# Patient Record
Sex: FEMALE | Race: BLACK OR AFRICAN AMERICAN | Employment: OTHER | URBAN - METROPOLITAN AREA
[De-identification: names, ages, dates, MRNs, and addresses within clinical notes are randomized per-mention and may not be internally consistent; named-entity substitution may affect disease eponyms.]

---

## 2017-02-24 RX ORDER — CARVEDILOL PHOSPHATE 40 MG/1
40 CAPSULE, EXTENDED RELEASE ORAL
Qty: 30 CAP | Refills: 6 | Status: SHIPPED | OUTPATIENT
Start: 2017-02-24 | End: 2017-12-11

## 2017-02-24 NOTE — TELEPHONE ENCOUNTER
Patient called stating she spoke with a Medicare rep and they told her the Coreg medication will have to be a changed to another medication that is covered by insurance. Please call patient to get more info from her. She said if you cant reach her at home to reach her on cell phone listed.

## 2017-03-31 RX ORDER — ATORVASTATIN CALCIUM 20 MG/1
TABLET, FILM COATED ORAL
Qty: 90 TAB | Refills: 0 | Status: SHIPPED | OUTPATIENT
Start: 2017-03-31 | End: 2017-06-26 | Stop reason: DRUGHIGH

## 2017-06-26 ENCOUNTER — OFFICE VISIT (OUTPATIENT)
Dept: ENDOCRINOLOGY | Age: 67
End: 2017-06-26

## 2017-06-26 VITALS
BODY MASS INDEX: 40.7 KG/M2 | WEIGHT: 215.6 LBS | SYSTOLIC BLOOD PRESSURE: 136 MMHG | HEIGHT: 61 IN | OXYGEN SATURATION: 97 % | TEMPERATURE: 97.4 F | RESPIRATION RATE: 18 BRPM | HEART RATE: 96 BPM | DIASTOLIC BLOOD PRESSURE: 74 MMHG

## 2017-06-26 DIAGNOSIS — I10 ESSENTIAL HYPERTENSION: ICD-10-CM

## 2017-06-26 DIAGNOSIS — E78.2 MIXED HYPERLIPIDEMIA: ICD-10-CM

## 2017-06-26 RX ORDER — ATORVASTATIN CALCIUM 40 MG/1
40 TABLET, FILM COATED ORAL
Qty: 30 TAB | Refills: 6 | Status: SHIPPED | OUTPATIENT
Start: 2017-06-26 | End: 2018-02-08 | Stop reason: SDUPTHER

## 2017-06-26 NOTE — MR AVS SNAPSHOT
Visit Information Date & Time Provider Department Dept. Phone Encounter #  
 6/26/2017  9:15 AM Amy Adrian MD [facility] [phone] [id number] Follow-up Instructions Return in about 6 months (around 12/26/2017). Upcoming Health Maintenance Date Due Hepatitis C Screening 1950 FOOT EXAM Q1 3/20/1962 DTaP/Tdap/Td series (1 - Tdap) 3/20/1973 BREAST CANCER SCRN MAMMOGRAM 3/20/2002 FOBT Q 1 YEAR AGE 50-75 3/20/2002 ZOSTER VACCINE AGE 60> 3/20/2012 OSTEOPOROSIS SCREENING (DEXA) 3/20/2017 Pneumococcal 65+ Low/Medium Risk (1 of 2 - PCV13) 3/20/2017 MEDICARE YEARLY EXAM 3/20/2017 INFLUENZA AGE 9 TO ADULT 8/1/2017 EYE EXAM RETINAL OR DILATED Q1 12/5/2017 HEMOGLOBIN A1C Q6M 12/9/2017 MICROALBUMIN Q1 6/9/2018 LIPID PANEL Q1 6/9/2018 GLAUCOMA SCREENING Q2Y 12/5/2018 Allergies as of 6/26/2017  Review Complete On: 6/26/2017 By: Amy Adrian MD  
 No Known Allergies Current Immunizations  Never Reviewed No immunizations on file. Not reviewed this visit You Were Diagnosed With   
  
 Codes Comments Uncontrolled type 2 diabetes mellitus with stage 2 chronic kidney disease, without long-term current use of insulin (HCC)    -  Primary ICD-10-CM: E11.22, E11.65, N18.2 ICD-9-CM: 250.52, 585.2 Mixed hyperlipidemia     ICD-10-CM: E78.2 ICD-9-CM: 272.2 Essential hypertension     ICD-10-CM: I10 
ICD-9-CM: 401.9 Vitals BP Pulse Temp Resp Height(growth percentile) Weight(growth percentile) 136/74 (BP 1 Location: Right arm, BP Patient Position: Sitting) 96 97.4 °F (36.3 °C) (Oral) 18 5' 1\" (1.549 m) 215 lb 9.6 oz (97.8 kg) SpO2 BMI OB Status Smoking Status 97% 40.74 kg/m2 Unknown Never Smoker BMI and BSA Data Body Mass Index Body Surface Area 40.74 kg/m 2 2.05 m 2 Preferred Pharmacy Pharmacy Name Phone [facility], [address] [place] [address] Jennifer Lopez [phone] Your Updated Medication List  
  
   
This list is accurate as of: 6/26/17  9:59 AM.  Always use your most recent med list.  
  
  
  
  
 * amLODIPine 10 mg tablet Commonly known as:  Gaurav Hartland TAKE 1 TABLET BY MOUTH DAILY  
  
 * amLODIPine 10 mg tablet Commonly known as:  Gaurav Hartland TAKE 1 TABLET BY MOUTH DAILY  
  
 aspirin 81 mg tablet Take 81 mg by mouth. atorvastatin 40 mg tablet Commonly known as:  LIPITOR Take 1 Tab by mouth nightly. Stop 20 mg dose * carvedilol 40 mg CR capsule Commonly known as:  COREG CR Take 1 Cap by mouth daily (with breakfast). Dispense generic. Stop coreg * COREG CR 40 mg CR capsule Generic drug:  carvedilol TAKE 1 CAPSULE BY MOUTH EVERY DAY WITH BREAKFAST  
  
 glucose blood VI test strips strip Commonly known as:  ONE TOUCH TEST  
100 Each by Does Not Apply route two (2) times a day. Dx. Code 250.02  
  
 loratadine 10 mg tablet Commonly known as:  Arleta Pals Take 10 mg by mouth.  
  
 metFORMIN 500 mg tablet Commonly known as:  GLUCOPHAGE  
TAKE 1 TABLET BY MOUTH TWICE DAILY WITH MEAL  
  
 TRADJENTA 5 mg tablet Generic drug:  linagliptin TAKE 1 TABLET BY MOUTH DAILY * Notice: This list has 4 medication(s) that are the same as other medications prescribed for you. Read the directions carefully, and ask your doctor or other care provider to review them with you. Prescriptions Sent to Pharmacy Refills  
 atorvastatin (LIPITOR) 40 mg tablet 6 Sig: Take 1 Tab by mouth nightly. Stop 20 mg dose Class: Normal  
 Pharmacy: [address] [place] [address] AT [facility] of [address] [place] [address] #: [phone] Route: Oral  
  
Follow-up Instructions Return in about 6 months (around 12/26/2017). Patient Instructions Increase to lipitor  40 mg at night  
 
 
tradjenta 5 mg a day Metformin 500 mg twice a day with meals  
 
on norvasc 10 mg a day  
on coreg cr 40 mg once a day Introducing [facility] SERVICES! Dear Daksha Yap: Thank you for requesting a [employer] account. Our records indicate that you already have an active [email or web address] account. You can access your account anytime at [email or web address] Did you know that you can access your hospital and ER discharge instructions at any time in [email or web address]? You can also review all of your test results from your hospital stay or ER visit. Additional Information If you have questions, please visit the Frequently Asked Questions section of the [email or web address] website at [email or web address] Remember, [email or web address] is NOT to be used for urgent needs. For medical emergencies, dial 911. Now available from your iPhone and Android! Please provide this summary of care documentation to your next provider. Your primary care clinician is listed as Alhaji Sheets. If you have any questions after today's visit, please call [phone].

## 2017-06-26 NOTE — PATIENT INSTRUCTIONS
Increase to lipitor  40 mg at night       tradjenta 5 mg a day   Metformin 500 mg twice a day with meals     on norvasc 10 mg a day   on coreg cr 40 mg once a day

## 2017-06-26 NOTE — PROGRESS NOTES
Wt Readings from Last 3 Encounters:   06/26/17 215 lb 9.6 oz (97.8 kg)   12/16/16 213 lb (96.6 kg)   06/01/16 211 lb (95.7 kg)     Temp Readings from Last 3 Encounters:   06/26/17 97.4 °F (36.3 °C) (Oral)   12/16/16 97.5 °F (36.4 °C) (Oral)   06/01/16 96.9 °F (36.1 °C) (Oral)     BP Readings from Last 3 Encounters:   06/26/17 136/74   12/16/16 160/71   06/01/16 157/84     Pulse Readings from Last 3 Encounters:   06/26/17 96   12/16/16 95   06/01/16 83     Lab Results   Component Value Date/Time    Hemoglobin A1c 6.6 06/09/2017 11:08 AM    Hemoglobin A1c (POC) 5.9 02/28/2014 12:11 PM   Foot Exam: no  Eye Exam: June 2017

## 2017-06-26 NOTE — PROGRESS NOTES
HISTORY OF PRESENT ILLNESS    Mony Bailon is a 61  y.o. female. HPI,   Patient here for f/u after last  visit for  Type 2 diabetes mellitus  From [facility] [address] 2016  Weight gain of 2  lbs ( gained total of 38 lbs)  She got compliant with diet     she is able to tolerate metformin and tradjenta  She has been doing well  She feels fine  Gained 2 lbs   Reports no post prandial pain at all       Good range of  blood sugars       Old history : .   Current A1C is 6.7 % today, compared to [facility]- over 10 %   Current diabetic medications include janumet and amaryl 2mg a day. accompanied by her sister, who is a cook   Current monitoring regimen: home blood tests - 2-3 times daily   Home blood sugar records: trend: stable   Any episodes of hypoglycemia? no   Weight trend: stable   Prior visit with dietician: yes -     Review of Systems   Constitutional: Negative. HENT: Negative. Eyes: Negative for pain and redness. Respiratory: Negative. Cardiovascular: Negative for chest pain, palpitations and leg swelling. Gastrointestinal: Negative. Negative for constipation. Genitourinary: Negative. Musculoskeletal: Negative for myalgias. Skin: Negative. Neurological: Negative. Endo/Heme/Allergies: Negative. Psychiatric/Behavioral: Negative for depression and memory loss. The patient does not have insomnia. Physical Exam   Constitutional: She is oriented to person, place, and time. She appears well-developed and well-nourished. HENT:   Head: Normocephalic. Eyes: Conjunctivae and EOM are normal. Pupils are equal, round, and reactive to light. Neck: Normal range of motion. Neck supple. No JVD present. No tracheal deviation present. No thyromegaly present. Cardiovascular: Normal rate, regular rhythm and normal heart sounds. No murmur heard. Pulmonary/Chest: Breath sounds normal.   Abdominal: Soft. Bowel sounds are normal.   Musculoskeletal: Normal range of motion.    Lymphadenopathy:   She has no cervical adenopathy. Neurological: She is alert and oriented to person, place, and time. She has normal reflexes. Skin: Skin is warm. Psychiatric: She has a normal mood and affect. Diabetic feet exam: june 2017    Dr. Zhang [place]      H/o partial or complete amputation of foot: n  H/o previous foot ulceration: yes  H/o pre - ulcerative callus: n  H/o peripheral neuropathy and callus: yes  H/o poor circulation     JESUS   : y  Foot deformity : none      Lab Results   Component Value Date/Time    Hemoglobin A1c 6.6 06/09/2017 11:08 AM    Hemoglobin A1c 6.4 12/09/2016 09:47 AM    Hemoglobin A1c 6.3 05/25/2016 08:16 AM    Microalb/Creat ratio (ug/mg creat.) 8.8 06/09/2017 11:08 AM    LDL, calculated 106 06/09/2017 11:08 AM    Creatinine 1.11 06/09/2017 11:08 AM      Lab Results   Component Value Date/Time    Cholesterol, total 164 06/09/2017 11:08 AM    HDL Cholesterol 42 06/09/2017 11:08 AM    LDL, calculated 106 06/09/2017 11:08 AM    Triglyceride 82 06/09/2017 11:08 AM     Lab Results   Component Value Date/Time    AST (SGOT) 14 06/09/2017 11:08 AM    Alk. phosphatase 97 06/09/2017 11:08 AM     Lab Results   Component Value Date/Time    GFR est AA 60 06/09/2017 11:08 AM    GFR est non-AA 52 06/09/2017 11:08 AM    Creatinine 1.11 06/09/2017 11:08 AM    BUN 26 06/09/2017 11:08 AM    Sodium 140 06/09/2017 11:08 AM    Potassium 4.1 06/09/2017 11:08 AM    Chloride 102 06/09/2017 11:08 AM    CO2 20 06/09/2017 11:08 AM          ASSESSMENT and PLAN    1.  Type 2 DM uncontrolled : A1c is  6.6 %      From June 2017   Compared to   6.4 %   From dec 2016   Compared to    6.3 %      From  May 2016    Compared to  6.3 %    From   Nov 2015  Compared to    6.4 %        From aug 2015  Compared to 6.3 %   From May  2015 compared to  6.5  %     From feb 2015 compared to  6.1 %  From aug 2014 compared to   5.9 % from feb 2014 compared to 6.2 % from oct 2013 compared to   6.7 % from July 203 compared to  5.9 % from march 6 2013 compared to 6.7 % jan 2013 compared to 10 % from nov 2012     plateaued good   control   She has been maintaining  good  glycemic control on current regimen  Continue on tradjenta and  metformin 500 mg  Bid , low dose being used with  caution for renal insufficiency  as her creat is fluctuating     She is told to restrict carbs significantly    She  is advised to check blood sugars one times daily by rotation method. reviewed medications and side effects in detail   lab results and schedule of future lab studies reviewed with patient       2. Hypoglycemia : should not occur on current regimen    3. HTN : well controlled, on norvasc and coreg cr 40 mg   Off  exforge 160/10 once a day    As am afraid addition of diuretic could worsen renal insufficiency and would make metformin to go out of her care    Patient is educated about importance of compliance with anti-hypertensives especially ARB/ACEI     4. Dyslipidemia : lipids  Out of target on lipitor 20 mg hs   Increase to 40 mg hs     Patient is educated about benefits and adverse effects of statins and explained how benefits outweigh risk. 5. use of aspirin to prevent MI and TIA's discussed     6.,h/o right foot diabetic ulcer, fourth toe -- resolved ,  And that was when she found out to be diabetic - October 2012  Will get PAD test result   [facility]        7.  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

## 2017-06-30 RX ORDER — ATORVASTATIN CALCIUM 20 MG/1
TABLET, FILM COATED ORAL
Qty: 90 TAB | Refills: 0 | Status: SHIPPED | OUTPATIENT
Start: 2017-06-30 | End: 2018-02-08 | Stop reason: DRUGHIGH

## 2017-12-04 ENCOUNTER — HOSPITAL ENCOUNTER (OUTPATIENT)
Dept: LAB | Age: 67
Discharge: HOME | End: 2017-12-04
Payer: MEDICARE

## 2017-12-04 PROCEDURE — 80061 LIPID PANEL: CPT

## 2017-12-04 PROCEDURE — 36415 COLL VENOUS BLD VENIPUNCTURE: CPT

## 2017-12-04 PROCEDURE — 80053 COMPREHEN METABOLIC PANEL: CPT

## 2017-12-04 PROCEDURE — 83036 HEMOGLOBIN GLYCOSYLATED A1C: CPT

## 2017-12-04 PROCEDURE — 82043 UR ALBUMIN QUANTITATIVE: CPT

## 2017-12-11 ENCOUNTER — OFFICE VISIT (OUTPATIENT)
Dept: ENDOCRINOLOGY | Age: 67
End: 2017-12-11

## 2017-12-11 VITALS
TEMPERATURE: 96.2 F | WEIGHT: 216.4 LBS | SYSTOLIC BLOOD PRESSURE: 151 MMHG | RESPIRATION RATE: 16 BRPM | HEART RATE: 82 BPM | HEIGHT: 61 IN | DIASTOLIC BLOOD PRESSURE: 78 MMHG | BODY MASS INDEX: 40.86 KG/M2 | OXYGEN SATURATION: 98 %

## 2017-12-11 DIAGNOSIS — I10 ESSENTIAL HYPERTENSION: ICD-10-CM

## 2017-12-11 DIAGNOSIS — E78.2 MIXED HYPERLIPIDEMIA: ICD-10-CM

## 2017-12-11 PROBLEM — E66.01 OBESITY, MORBID (HCC): Status: ACTIVE | Noted: 2017-12-11

## 2017-12-11 RX ORDER — DILTIAZEM HYDROCHLORIDE 240 MG/1
240 CAPSULE, COATED, EXTENDED RELEASE ORAL DAILY
Qty: 30 CAP | Refills: 6 | Status: SHIPPED | OUTPATIENT
Start: 2017-12-11 | End: 2018-02-08

## 2017-12-11 NOTE — PATIENT INSTRUCTIONS
Increase to lipitor  40 mg at night       STOP tradjenta 5 mg a day   Start on onglyza 5 mg a day   Metformin 500 mg twice a day with meals     STOP norvasc 10 mg a day , and  STOP  coreg cr 40 mg   Start on  Diltiazem  mg  once a day

## 2017-12-11 NOTE — PROGRESS NOTES
Chief Complaint   Patient presents with    Diabetes     1. Have you been to the ER, urgent care clinic since your last visit? Hospitalized since your last visit? No    2. Have you seen or consulted any other health care providers outside of the [address] [place] [address] since your last visit? Include any pap smears or colon screening.  No     Wt Readings from Last 3 Encounters:   12/11/17 216 lb 6.4 oz (98.2 kg)   06/26/17 215 lb 9.6 oz (97.8 kg)   12/16/16 213 lb (96.6 kg)     Temp Readings from Last 3 Encounters:   12/11/17 96.2 °F (35.7 °C) (Oral)   06/26/17 97.4 °F (36.3 °C) (Oral)   12/16/16 97.5 °F (36.4 °C) (Oral)     BP Readings from Last 3 Encounters:   12/11/17 151/78   06/26/17 136/74   12/16/16 160/71     Pulse Readings from Last 3 Encounters:   12/11/17 82   06/26/17 96   12/16/16 95     Pt do not meter  Pt had eye exam   No foot exam

## 2017-12-11 NOTE — PROGRESS NOTES
HISTORY OF PRESENT ILLNESS    Laverne Chilel is a 74  y.o. female. HPI,   Patient here for f/u after last  visit for  Type 2 diabetes mellitus  From June 2017       She is  compliant with diet   she is able to tolerate metformin and tradjenta  She has been doing well    She is in donut hole (paying out of pocket )  She feels fine  Gained 1 lbs   Reports no post prandial pain at all   Good range of  blood sugars       Old history : .   Current A1C is 6.7 % today, compared to [facility]- over 10 %   Current diabetic medications include janumet and amaryl 2mg a day. accompanied by her sister, who is a cook   Current monitoring regimen: home blood tests - 2-3 times daily   Home blood sugar records: trend: stable   Any episodes of hypoglycemia? no   Weight trend: stable   Prior visit with dietician: yes -     Review of Systems   Constitutional: Negative. HENT: Negative. Eyes: Negative for pain and redness. Respiratory: Negative. Cardiovascular: Negative for chest pain, palpitations and leg swelling. Gastrointestinal: Negative. Negative for constipation. Genitourinary: Negative. Musculoskeletal: Negative for myalgias. Skin: Negative. Neurological: Negative. Endo/Heme/Allergies: Negative. Psychiatric/Behavioral: Negative for depression and memory loss. The patient does not have insomnia. Physical Exam   Constitutional: She is oriented to person, place, and time. She appears well-developed and well-nourished. HENT:   Head: Normocephalic. Eyes: Conjunctivae and EOM are normal. Pupils are equal, round, and reactive to light. Neck: Normal range of motion. Neck supple. No JVD present. No tracheal deviation present. No thyromegaly present. Cardiovascular: Normal rate, regular rhythm and normal heart sounds. No murmur heard. Pulmonary/Chest: Breath sounds normal.   Abdominal: Soft. Bowel sounds are normal.   Musculoskeletal: Normal range of motion.    Lymphadenopathy:   She has no cervical adenopathy. Neurological: She is alert and oriented to person, place, and time. She has normal reflexes. Skin: Skin is warm. Psychiatric: She has a normal mood and affect. Diabetic feet exam: june 2017    Dr. Vidal Perrin      H/o partial or complete amputation of foot: n  H/o previous foot ulceration: yes  H/o pre - ulcerative callus: n  H/o peripheral neuropathy and callus: yes  H/o poor circulation     JESUS   : y  Foot deformity : none      Lab Results   Component Value Date/Time    Hemoglobin A1c 6.3 12/04/2017 09:51 AM    Hemoglobin A1c 6.6 06/09/2017 11:08 AM    Hemoglobin A1c 6.4 12/09/2016 09:47 AM    Microalb/Creat ratio (ug/mg creat.) 48.2 12/04/2017 09:51 AM    LDL, calculated 91 12/04/2017 09:51 AM    Creatinine 1.18 12/04/2017 09:51 AM      Lab Results   Component Value Date/Time    Cholesterol, total 148 12/04/2017 09:51 AM    HDL Cholesterol 40 12/04/2017 09:51 AM    LDL, calculated 91 12/04/2017 09:51 AM    Triglyceride 83 12/04/2017 09:51 AM     Lab Results   Component Value Date/Time    AST (SGOT) 16 12/04/2017 09:51 AM    Alk. phosphatase 106 12/04/2017 09:51 AM     Lab Results   Component Value Date/Time    GFR est AA 56 12/04/2017 09:51 AM    GFR est non-AA 49 12/04/2017 09:51 AM    Creatinine 1.18 12/04/2017 09:51 AM    BUN 24 12/04/2017 09:51 AM    Sodium 143 12/04/2017 09:51 AM    Potassium 4.5 12/04/2017 09:51 AM    Chloride 103 12/04/2017 09:51 AM    CO2 21 12/04/2017 09:51 AM          ASSESSMENT and PLAN    1.  Type 2 DM uncontrolled : A1c is  6.3 %     From dec 2017    compared to  6.6 %      From June 2017   Compared to   6.4 %   From dec 2016   Compared to    6.3 %      From  May 2016    Compared to  6.3 %    From   Nov 2015  Compared to    6.4 %        From aug 2015  Compared to 6.3 %   From May  2015 compared to  6.5  %     From feb 2015 compared to  6.1 %  From aug 2014 compared to   5.9 % from feb 2014 compared to 6.2 % from oct 2013 compared to   6.7 % from July 203 compared to  5.9 % from march 6 2013 compared to 6.7 % jan 2013 compared to 10 % from nov 2012     plateaued good   control   She has been maintaining  good  glycemic control on current regimen  Continue on tradjenta and  metformin 500 mg  Bid , low dose being used with  caution for renal insufficiency  as her creat is fluctuating   Changing to onglyza  from [address]  She is told to restrict carbs significantly    She  is advised to check blood sugars one times daily by rotation method. reviewed medications and side effects in detail   lab results and schedule of future lab studies reviewed with patient       2. Hypoglycemia : should not occur on current regimen    3. HTN : well controlled, on norvasc and coreg cr 40 mg   Off  exforge 160/10 once a day for elevated creat levels     I am choosing cardizem and stopping coreg cr and norvasc   Coreg cr is pretty expensive for her now as she is in donut hole     As am afraid addition of diuretic could worsen renal insufficiency and would make metformin to go out of her care    Patient is educated about importance of compliance with anti-hypertensives especially ARB/ACEI     4. Dyslipidemia : lipids  Out of target on lipitor 20 mg hs   Increase to 40 mg hs     Patient is educated about benefits and adverse effects of statins and explained how benefits outweigh risk. 5. use of aspirin to prevent MI and TIA's discussed     6.,h/o right foot diabetic ulcer, fourth toe -- resolved ,  And that was when she found out to be diabetic - October 2012   PAD test result   From [facility]      7.  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

## 2018-01-22 ENCOUNTER — TELEPHONE (OUTPATIENT)
Dept: ENDOCRINOLOGY | Age: 68
End: 2018-01-22

## 2018-01-22 DIAGNOSIS — E11.65 TYPE 2 DIABETES MELLITUS WITH HYPERGLYCEMIA, WITHOUT LONG-TERM CURRENT USE OF INSULIN (HCC): Primary | ICD-10-CM

## 2018-01-22 DIAGNOSIS — E78.2 MIXED HYPERLIPIDEMIA: ICD-10-CM

## 2018-01-22 DIAGNOSIS — I10 ESSENTIAL HYPERTENSION: ICD-10-CM

## 2018-01-22 NOTE — TELEPHONE ENCOUNTER
Patient would liek to talk to nurse about medications given to her.   Patient can be reached at [phone]

## 2018-01-24 NOTE — TELEPHONE ENCOUNTER
Attempted to call patient in regards to question and inform her Onglyza is no longer formulary and insurance prefers [facility]. A prescription will be sent to pharmacy. Left message and call back number.

## 2018-02-02 NOTE — TELEPHONE ENCOUNTER
Spoke with patient and she did start onglyza and cardizem in December. Patient has a rash all of her body. Prescribed steroid but is concerned about taking it due to increasing her blood sugar. Please advise.

## 2018-02-02 NOTE — TELEPHONE ENCOUNTER
Patient says she developed a rash on her arms, legs, and starting to move all over her body. Patient went to primary care physician and was prescribed a steroid. Patient says she has not started steroid medication because she is concerned about blood sugar rising. Please advise.

## 2018-02-03 ENCOUNTER — IP HISTORICAL/CONVERTED ENCOUNTER (OUTPATIENT)
Dept: OTHER | Age: 68
End: 2018-02-03

## 2018-02-06 ENCOUNTER — TELEPHONE (OUTPATIENT)
Dept: ENDOCRINOLOGY | Age: 68
End: 2018-02-06

## 2018-02-06 NOTE — TELEPHONE ENCOUNTER
Patient would like to discuss going back to the original drug she was on prior to going in the hospital. Please call

## 2018-02-06 NOTE — TELEPHONE ENCOUNTER
I spoke to pt regarding her rash 2 days ago     She got hospitalized and upon discharge, will decide on meds     She has not started on tradjenta , but she did start on cartia in place of coreg Jay Boogie MD pulmonary embolism

## 2018-02-07 ENCOUNTER — TELEPHONE (OUTPATIENT)
Dept: ENDOCRINOLOGY | Age: 68
End: 2018-02-07

## 2018-02-07 NOTE — TELEPHONE ENCOUNTER
Please give me a call I really need to talk to [facility] personally about my medication.

## 2018-02-08 ENCOUNTER — OFFICE VISIT (OUTPATIENT)
Dept: ENDOCRINOLOGY | Age: 68
End: 2018-02-08

## 2018-02-08 VITALS
SYSTOLIC BLOOD PRESSURE: 164 MMHG | HEART RATE: 94 BPM | RESPIRATION RATE: 12 BRPM | TEMPERATURE: 97.9 F | DIASTOLIC BLOOD PRESSURE: 86 MMHG | WEIGHT: 205 LBS | BODY MASS INDEX: 38.71 KG/M2 | HEIGHT: 61 IN

## 2018-02-08 DIAGNOSIS — I10 ESSENTIAL HYPERTENSION: ICD-10-CM

## 2018-02-08 DIAGNOSIS — E78.2 MIXED HYPERLIPIDEMIA: ICD-10-CM

## 2018-02-08 NOTE — MR AVS SNAPSHOT
49 [facility] [address] [facility] [address] 
[phone] Patient: Keiry Cano MRN: [id number] PLT:[id number]/97/[id number] Visit Information Date & Time Provider Department Dept. Phone Encounter #  
 2/8/2018 11:45 AM Veena Fonseca MD [facility] [phone] [id number] Your Appointments 6/11/2018  8:45 AM  
LAB with CDE NURSE [facility]) Appt Note: lab  
 [address] [place] [address] [address] [facility] [address]  
[phone]  
  
   
 [address] [facility] [address]  
  
    
 6/18/2018  9:30 AM  
ROUTINE CARE with Veena Fonseca MD  
[facility]) Appt Note: 6 mon fu DM  
 [facility] [facility] [address]  
[phone]  
  
   
 315 [facility] [address] Upcoming Health Maintenance Date Due Hepatitis C Screening 1950 FOOT EXAM Q1 3/20/1962 DTaP/Tdap/Td series (1 - Tdap) 3/20/1973 BREAST CANCER SCRN MAMMOGRAM 3/20/2002 FOBT Q 1 YEAR AGE 50-75 3/20/2002 ZOSTER VACCINE AGE 60> 1/20/2012 OSTEOPOROSIS SCREENING (DEXA) 3/20/2017 Pneumococcal 65+ Low/Medium Risk (1 of 2 - PCV13) 3/20/2017 MEDICARE YEARLY EXAM 3/20/2017 Influenza Age 5 to Adult 8/1/2017 EYE EXAM RETINAL OR DILATED Q1 12/5/2017 HEMOGLOBIN A1C Q6M 6/4/2018 MICROALBUMIN Q1 12/4/2018 LIPID PANEL Q1 12/4/2018 GLAUCOMA SCREENING Q2Y 12/5/2018 Allergies as of 2/8/2018  Review Complete On: 2/8/2018 By: Veena Fonseca MD  
  
 Severity Noted Reaction Type Reactions Jerome Marycarmen [Diltiazem Hcl]  02/08/2018    Rash Onglyza [Saxagliptin]  02/08/2018    Rash Current Immunizations  Never Reviewed No immunizations on file. Not reviewed this visit You Were Diagnosed With   
  
 Codes Comments  Uncontrolled type 2 diabetes mellitus with stage 2 chronic kidney disease, without long-term current use of insulin ([facility]    -  Primary ICD-10-CM: E11.22, E11.65, N18.2 ICD-9-CM: 250.52, 585.2 Mixed hyperlipidemia     ICD-10-CM: E78.2 ICD-9-CM: 272.2 Essential hypertension     ICD-10-CM: I10 
ICD-9-CM: 401.9 Vitals BP Pulse Temp Resp Height(growth percentile) Weight(growth percentile) 164/86 (BP 1 Location: Left arm, BP Patient Position: Sitting) 94 97.9 °F (36.6 °C) (Oral) 12 5' 1\" (1.549 m) 205 lb (93 kg) BMI OB Status Smoking Status 38.73 kg/m2 Unknown Never Smoker Vitals History BMI and BSA Data Body Mass Index Body Surface Area 38.73 kg/m 2 2 m 2 Preferred Pharmacy Pharmacy Name Phone 99 [facility], [address] [place] [address] [phone] Your Updated Medication List  
  
   
This list is accurate as of: 2/8/18 12:50 PM.  Always use your most recent med list.  
  
  
  
  
 aspirin 81 mg tablet Take 81 mg by mouth. atorvastatin 40 mg tablet Commonly known as:  LIPITOR Take 1 Tab by mouth nightly. Stop 20 mg dose COREG CR 40 mg CR capsule Generic drug:  carvedilol TAKE 1 CAPSULE BY MOUTH EVERY DAY WITH BREAKFAST  
  
 glucose blood VI test strips strip Commonly known as:  ONE TOUCH TEST  
100 Each by Does Not Apply route two (2) times a day. Dx. Code 250.02  
  
 loratadine 10 mg tablet Commonly known as:  Laura Ernesto Take 10 mg by mouth.  
  
 metFORMIN 500 mg tablet Commonly known as:  GLUCOPHAGE  
TAKE 1 TABLET BY MOUTH TWICE DAILY WITH MEAL Patient Instructions Increase to lipitor  40 mg at night Resume  tradjenta 5 mg a day Stop onglyza  For allergic rash Metformin 500 mg twice a day with meals  
 
resume norvasc 10 mg a day STOP  coreg cr 40 mg for cost  
Stop cartia   mg  once a day for allergic rash Start on coreg 25 mg  One pill  twice a day Introducing [facility]! Dear Jacob Hope: Thank you for requesting a GPMESS account. Our records indicate that you already have an active GPMESS account. You can access your account anytime at [email or web address] Did you know that you can access your hospital and ER discharge instructions at any time in GPMESS? You can also review all of your test results from your hospital stay or ER visit. Additional Information If you have questions, please visit the Frequently Asked Questions section of the GPMESS website at [email or web address] Remember, GPMESS is NOT to be used for urgent needs. For medical emergencies, dial 911. Now available from your iPhone and Android! Please provide this summary of care documentation to your next provider. Your primary care clinician is listed as Epifanio Scruggs. If you have any questions after today's visit, please call [phone].

## 2018-02-08 NOTE — PATIENT INSTRUCTIONS
Increase to lipitor  40 mg at night       Resume  tradjenta 5 mg a day   Stop onglyza  For allergic rash  Metformin 500 mg twice a day with meals     resume norvasc 10 mg a day    STOP  coreg cr 40 mg for cost   Stop cartia   mg  once a day for allergic rash     Start on coreg 25 mg  One pill  twice a day

## 2018-02-08 NOTE — PROGRESS NOTES
Pt had a reaction to new medication, either onglyza or cardizem, or both, pt is not sure.     Wt Readings from Last 3 Encounters:   02/08/18 205 lb (93 kg)   12/11/17 216 lb 6.4 oz (98.2 kg)   06/26/17 215 lb 9.6 oz (97.8 kg)     Temp Readings from Last 3 Encounters:   02/08/18 97.9 °F (36.6 °C) (Oral)   12/11/17 96.2 °F (35.7 °C) (Oral)   06/26/17 97.4 °F (36.3 °C) (Oral)     BP Readings from Last 3 Encounters:   02/08/18 164/86   12/11/17 151/78   06/26/17 136/74     Pulse Readings from Last 3 Encounters:   02/08/18 94   12/11/17 82   06/26/17 96     Lab Results   Component Value Date/Time    Hemoglobin A1c 6.3 (H) 12/04/2017 09:51 AM    Hemoglobin A1c (POC) 5.9 02/28/2014 12:11 PM

## 2018-02-08 NOTE — PROGRESS NOTES
HISTORY OF PRESENT ILLNESS    Keily Mendiola is a 74  y.o. female. HPI,   Patient here for f/u after last  visit for  Type 2 diabetes mellitus  From dec 2017     She got hospitalized with severe allergic reaction,  And got released  y- day   From the hospital      She had to change to onglyza and cartia from tradjenta and coreg cr  3 weeks ago   She developed skin rash   A week ago   She is now on tapering doses of prednisone      She is  compliant with diet   she is able to tolerate metformin and tradjenta  She has been doing well    She is in donut hole (paying out of pocket )  She feels fine  Gained 1 lbs   Reports no post prandial pain at all   Good range of  blood sugars       Old history : .   Current A1C is 6.7 % today, compared to [facility]- over 10 %   Current diabetic medications include janumet and amaryl 2mg a day. accompanied by her sister, who is a cook   Current monitoring regimen: home blood tests - 2-3 times daily   Home blood sugar records: trend: stable   Any episodes of hypoglycemia? no   Weight trend: stable   Prior visit with dietician: yes -     Review of Systems   Constitutional: Negative. HENT: Negative. Eyes: Negative for pain and redness. Respiratory: Negative. Cardiovascular: Negative for chest pain, palpitations and leg swelling. Gastrointestinal: Negative. Negative for constipation. Genitourinary: Negative. Musculoskeletal: Negative for myalgias. Skin: Negative. Neurological: Negative. Endo/Heme/Allergies: Negative. Psychiatric/Behavioral: Negative for depression and memory loss. The patient does not have insomnia. Physical Exam   Constitutional: She is oriented to person, place, and time. She appears well-developed and well-nourished. HENT:   Head: Normocephalic. Eyes: Conjunctivae and EOM are normal. Pupils are equal, round, and reactive to light. Neck: Normal range of motion. Neck supple. No JVD present. No tracheal deviation present.  No thyromegaly present. Cardiovascular: Normal rate, regular rhythm and normal heart sounds. No murmur heard. Pulmonary/Chest: Breath sounds normal.   Abdominal: Soft. Bowel sounds are normal.   Musculoskeletal: Normal range of motion. Lymphadenopathy:   She has no cervical adenopathy. Neurological: She is alert and oriented to person, place, and time. She has normal reflexes. Skin: Skin is warm. Psychiatric: She has a normal mood and affect. Diabetic feet exam: june 2017    Dr. Hui Souza      H/o partial or complete amputation of foot: n  H/o previous foot ulceration: yes  H/o pre - ulcerative callus: n  H/o peripheral neuropathy and callus: yes  H/o poor circulation     JESUS   : y  Foot deformity : none      Lab Results   Component Value Date/Time    Hemoglobin A1c 6.3 (H) 12/04/2017 09:51 AM    Hemoglobin A1c 6.6 (H) 06/09/2017 11:08 AM    Hemoglobin A1c 6.4 (H) 12/09/2016 09:47 AM    Microalb/Creat ratio (ug/mg creat.) 48.2 (H) 12/04/2017 09:51 AM    LDL, calculated 91 12/04/2017 09:51 AM    Creatinine 1.18 (H) 12/04/2017 09:51 AM      Lab Results   Component Value Date/Time    Cholesterol, total 148 12/04/2017 09:51 AM    HDL Cholesterol 40 12/04/2017 09:51 AM    LDL, calculated 91 12/04/2017 09:51 AM    Triglyceride 83 12/04/2017 09:51 AM     Lab Results   Component Value Date/Time    AST (SGOT) 16 12/04/2017 09:51 AM    Alk. phosphatase 106 12/04/2017 09:51 AM     Lab Results   Component Value Date/Time    GFR est AA 56 (L) 12/04/2017 09:51 AM    GFR est non-AA 49 (L) 12/04/2017 09:51 AM    Creatinine 1.18 (H) 12/04/2017 09:51 AM    BUN 24 12/04/2017 09:51 AM    Sodium 143 12/04/2017 09:51 AM    Potassium 4.5 12/04/2017 09:51 AM    Chloride 103 12/04/2017 09:51 AM    CO2 21 12/04/2017 09:51 AM          ASSESSMENT and PLAN      1. Severe allergic skin rash from cartia  XT  and form onglyza   5mg       2. Steroid induced fluctuation  On sugars       3.  Type 2 DM uncontrolled : A1c is  6.3 % From dec 2017    compared to  6.6 %      From June 2017   Compared to   6.4 %   From dec 2016   Compared to    6.3 %      From  May 2016    Compared to  6.3 %    From   Nov 2015  Compared to    6.4 %        From aug 2015  Compared to 6.3 %   From May  2015 compared to  6.5  %     From feb 2015 compared to  6.1 %  From aug 2014 compared to   5.9 % from feb 2014 compared to 6.2 % from oct 2013 compared to   6.7 % from July 203 compared to  5.9 % from march 6 2013 compared to 6.7 % jan 2013 compared to 10 % from nov 2012     plateaued good   control   She has been maintaining  good  glycemic control on current regimen  Was doing very well on tradjenta and  metformin 500 mg  Bid , low dose being used with  caution for renal insufficiency  as her creat is fluctuating   Changed to onglyza  from [address], but she developed severe allergy to  onglyza   She would liek to go back to tradjents     She  is advised to check blood sugars one times daily by rotation method. reviewed medications and side effects in detail   lab results and schedule of future lab studies reviewed with patient       2. Hypoglycemia : should not occur on current regimen    3. HTN : well controlled, on norvasc and coreg cr 40 mg   Off  exforge 160/10 once a day for elevated creat levels     I chose  cardizem and stopped coreg cr and norvasc for cost reasons   Coreg cr is pretty expensive for her  as she is in donut hole    She turned out to be allergic to [facility]     She wanted to go for coreg 25 mg bid and norvasc 10 mg , as these are her discharge meds     As am afraid addition of diuretic could worsen renal insufficiency and would make metformin to go out of her care    Patient is educated about importance of compliance with anti-hypertensives especially ARB/ACEI     4.  Dyslipidemia : lipids  Out of target on lipitor 20 mg hs   Increase to 40 mg hs     Patient is educated about benefits and adverse effects of statins and explained how benefits outweigh risk. 5. use of aspirin to prevent MI and TIA's discussed     6.,h/o right foot diabetic ulcer, fourth toe -- resolved ,  And that was when she found out to be diabetic - October 2012   PAD test result   From [facility]      7.  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

## 2018-02-09 RX ORDER — AMLODIPINE BESYLATE 10 MG/1
10 TABLET ORAL DAILY
Qty: 30 TAB | Refills: 6 | Status: SHIPPED | OUTPATIENT
Start: 2018-02-09 | End: 2018-10-08 | Stop reason: SDUPTHER

## 2018-02-09 RX ORDER — METFORMIN HYDROCHLORIDE 500 MG/1
TABLET ORAL
Qty: 180 TAB | Refills: 4 | Status: SHIPPED | OUTPATIENT
Start: 2018-02-09 | End: 2018-04-25 | Stop reason: SDUPTHER

## 2018-02-09 RX ORDER — CARVEDILOL 25 MG/1
25 TABLET ORAL 2 TIMES DAILY WITH MEALS
Qty: 60 TAB | Refills: 6 | Status: SHIPPED | OUTPATIENT
Start: 2018-02-09 | End: 2018-10-02 | Stop reason: SDUPTHER

## 2018-02-09 RX ORDER — ATORVASTATIN CALCIUM 40 MG/1
40 TABLET, FILM COATED ORAL
Qty: 30 TAB | Refills: 6 | Status: SHIPPED | OUTPATIENT
Start: 2018-02-09 | End: 2018-05-24 | Stop reason: SDUPTHER

## 2018-03-31 RX ORDER — METFORMIN HYDROCHLORIDE 500 MG/1
TABLET ORAL
Qty: 180 TAB | Refills: 4 | Status: SHIPPED | OUTPATIENT
Start: 2018-03-31 | End: 2019-02-26 | Stop reason: SDUPTHER

## 2018-04-11 ENCOUNTER — HOSPITAL ENCOUNTER (OUTPATIENT)
Dept: LAB | Age: 68
Discharge: HOME | End: 2018-04-11
Payer: MEDICARE

## 2018-04-11 PROCEDURE — 36415 COLL VENOUS BLD VENIPUNCTURE: CPT

## 2018-04-11 PROCEDURE — 80053 COMPREHEN METABOLIC PANEL: CPT

## 2018-04-11 PROCEDURE — 80061 LIPID PANEL: CPT

## 2018-04-11 PROCEDURE — 83036 HEMOGLOBIN GLYCOSYLATED A1C: CPT

## 2018-04-11 PROCEDURE — 82043 UR ALBUMIN QUANTITATIVE: CPT

## 2018-04-25 ENCOUNTER — OFFICE VISIT (OUTPATIENT)
Dept: ENDOCRINOLOGY | Age: 68
End: 2018-04-25

## 2018-04-25 VITALS
BODY MASS INDEX: 40.35 KG/M2 | OXYGEN SATURATION: 96 % | HEART RATE: 80 BPM | HEIGHT: 61 IN | DIASTOLIC BLOOD PRESSURE: 70 MMHG | TEMPERATURE: 97 F | SYSTOLIC BLOOD PRESSURE: 150 MMHG | WEIGHT: 213.7 LBS | RESPIRATION RATE: 18 BRPM

## 2018-04-25 DIAGNOSIS — I10 ESSENTIAL HYPERTENSION: ICD-10-CM

## 2018-04-25 DIAGNOSIS — E78.2 MIXED HYPERLIPIDEMIA: ICD-10-CM

## 2018-04-25 DIAGNOSIS — E66.01 OBESITY, MORBID (HCC): ICD-10-CM

## 2018-04-25 NOTE — PROGRESS NOTES
HISTORY OF PRESENT ILLNESS    Kristen Amato is a 79  y.o. female. HPI,   Patient here for f/u after last  visit for  Type 2 diabetes mellitus  From feb 2018      Her rash is cleared   Pt is good with cost of medications     She is doing well   Gained 8 lbs as she recovered the sickness         Old history     She got hospitalized with severe allergic reaction,  And got released  y- day   From the hospital      She had to change to onglyza and cartia from tradjenta and coreg cr  3 weeks ago   She developed skin rash   A week ago   She is now on tapering doses of prednisone      She is  compliant with diet   she is able to tolerate metformin and tradjenta  She has been doing well    She is in donut hole (paying out of pocket )  She feels fine  Gained 1 lbs   Reports no post prandial pain at all   Good range of  blood sugars       Old history : .   Current A1C is 6.7 % today, compared to [facility]- over 10 %   Current diabetic medications include janumet and amaryl 2mg a day. accompanied by her sister, who is a cook   Current monitoring regimen: home blood tests - 2-3 times daily   Home blood sugar records: trend: stable   Any episodes of hypoglycemia? no   Weight trend: stable   Prior visit with dietician: yes -     Review of Systems   Constitutional: Negative. HENT: Negative. Eyes: Negative for pain and redness. Respiratory: Negative. Cardiovascular: Negative for chest pain, palpitations and leg swelling. Gastrointestinal: Negative. Negative for constipation. Genitourinary: Negative. Musculoskeletal: Negative for myalgias. Skin: Negative. Neurological: Negative. Endo/Heme/Allergies: Negative. Psychiatric/Behavioral: Negative for depression and memory loss. The patient does not have insomnia. Physical Exam   Constitutional: She is oriented to person, place, and time. She appears well-developed and well-nourished. HENT:   Head: Normocephalic.    Eyes: Conjunctivae and EOM are normal. Pupils are equal, round, and reactive to light. Neck: Normal range of motion. Neck supple. No JVD present. No tracheal deviation present. No thyromegaly present. Cardiovascular: Normal rate, regular rhythm and normal heart sounds. No murmur heard. Pulmonary/Chest: Breath sounds normal.   Abdominal: Soft. Bowel sounds are normal.   Musculoskeletal: Normal range of motion. Lymphadenopathy:   She has no cervical adenopathy. Neurological: She is alert and oriented to person, place, and time. She has normal reflexes. Skin: Skin is warm. Psychiatric: She has a normal mood and affect. Diabetic foot exam: April 2018      Left: Reflexes nd     Vibratory sensation normal    Proprioception nd   Sharp/dull discrimination nd    Filament test normal sensation with micro filament   Pulse DP: 2+ (normal)   Pulse PT: nd   Deformities: None  Right: Reflexes nd   Vibratory sensation normal   Proprioception nd   Sharp/dull discrimination nd   Filament test normal sensation with micro filament   Pulse DP: 2+ (normal)   Pulse PT: nd   Deformities: None        Lab Results   Component Value Date/Time    Hemoglobin A1c 6.4 (H) 04/11/2018 12:01 PM    Hemoglobin A1c 6.3 (H) 12/04/2017 09:51 AM    Hemoglobin A1c 6.6 (H) 06/09/2017 11:08 AM    Microalb/Creat ratio (ug/mg creat.) 11.8 04/11/2018 12:01 PM    LDL, calculated 103 (H) 04/11/2018 12:01 PM    Creatinine 1.14 (H) 04/11/2018 12:01 PM      Lab Results   Component Value Date/Time    Cholesterol, total 165 04/11/2018 12:01 PM    HDL Cholesterol 45 04/11/2018 12:01 PM    LDL, calculated 103 (H) 04/11/2018 12:01 PM    Triglyceride 84 04/11/2018 12:01 PM     Lab Results   Component Value Date/Time    AST (SGOT) 17 04/11/2018 12:01 PM    Alk.  phosphatase 109 04/11/2018 12:01 PM     Lab Results   Component Value Date/Time    GFR est AA 58 (L) 04/11/2018 12:01 PM    GFR est non-AA 50 (L) 04/11/2018 12:01 PM    Creatinine 1.14 (H) 04/11/2018 12:01 PM    BUN 20 04/11/2018 12:01 PM    Sodium 142 04/11/2018 12:01 PM    Potassium 4.3 04/11/2018 12:01 PM    Chloride 102 04/11/2018 12:01 PM    CO2 23 04/11/2018 12:01 PM          ASSESSMENT and PLAN      1. Severe allergic skin rash from cartia  XT  and form onglyza   5mg       2. Steroid induced fluctuation  On sugars       3. Type 2 DM uncontrolled : A1c is   6.4 %        From    April 2018    comapred to   6.3 %     From dec 2017    compared to  6.6 %      From June 2017   Compared to   6.4 %   From dec 2016   Compared to    6.3 %      From  May 2016    Compared to  6.3 %    From   Nov 2015  Compared to    6.4 %        From aug 2015  Compared to 6.3 %   From May  2015 compared to  6.5  %     From feb 2015 compared to  6.1 %  From aug 2014 compared to   5.9 % from feb 2014 compared to 6.2 % from oct 2013 compared to   6.7 % from July 203 compared to  5.9 % from march 6 2013 compared to 6.7 % jan 2013 compared to 10 % from nov 2012     plateaued good   control   She has been maintaining  good  glycemic control on current regimen  Was doing very well on tradjenta and  metformin 500 mg  Bid , low dose being used with  caution for renal insufficiency  as her creat is fluctuating   Changed to onglyza  from [address], but she developed severe allergy to  onglyza   She would liek to go back to tradjents     She  is advised to check blood sugars one times daily by rotation method. reviewed medications and side effects in detail   lab results and schedule of future lab studies reviewed with patient       2. Hypoglycemia : should not occur on current regimen    3.  HTN : well controlled, on norvasc and coreg cr 40 mg   Off  exforge 160/10 once a day for elevated creat levels     I chose  cardizem and stopped coreg cr and norvasc for cost reasons   [facility]    She turned out to be allergic to [facility]     She wanted to go for coreg 25 mg bid and norvasc 10 mg , as these are her discharge meds     As am afraid addition of diuretic could worsen renal insufficiency and would make metformin to go out of her care    Patient is educated about importance of compliance with anti-hypertensives especially ARB/ACEI     4. Dyslipidemia : lipids  Out of target on lipitor 20 mg hs   Increase to 40 mg hs     Patient is educated about benefits and adverse effects of statins and explained how benefits outweigh risk. 5. use of aspirin to prevent MI and TIA's discussed     6.,h/o right foot diabetic ulcer, fourth toe -- resolved ,  And that was when she found out to be diabetic - October 2012   PAD test result   From [facility]      7.  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 Jason Gamez )    Labs bnv in 6 months   Patient voiced understanding her plan of care

## 2018-04-25 NOTE — MR AVS SNAPSHOT
49 [facility] [address] 
[phone] Patient: Hanane Luong MRN: [id number] ZAB:[birth year] Visit Information Date & Time Provider Department Dept. Phone Encounter #  
 4/25/2018  9:15 AM Hien Montague MD [facility] [phone] [id number] Follow-up Instructions Return in about 6 months (around 10/25/2018). Upcoming Health Maintenance Date Due Hepatitis C Screening 1950 FOOT EXAM Q1 3/20/1962 DTaP/Tdap/Td series (1 - Tdap) 3/20/1973 BREAST CANCER SCRN MAMMOGRAM 3/20/2002 FOBT Q 1 YEAR AGE 50-75 3/20/2002 ZOSTER VACCINE AGE 60> 1/20/2012 Bone Densitometry (Dexa) Screening 3/20/2017 Pneumococcal 65+ Low/Medium Risk (1 of 2 - PCV13) 3/20/2017 Influenza Age 5 to Adult 8/1/2017 EYE EXAM RETINAL OR DILATED Q1 12/5/2017 MEDICARE YEARLY EXAM 3/14/2018 HEMOGLOBIN A1C Q6M 10/11/2018 GLAUCOMA SCREENING Q2Y 12/5/2018 MICROALBUMIN Q1 4/11/2019 LIPID PANEL Q1 4/11/2019 Allergies as of 4/25/2018  Review Complete On: 4/25/2018 By: Hien Montague MD  
  
 Severity Noted Reaction Type Reactions Fidel Laura [Diltiazem Hcl]  02/08/2018    Rash Onglyza [Saxagliptin]  02/08/2018    Rash Current Immunizations  Never Reviewed No immunizations on file. Not reviewed this visit You Were Diagnosed With   
  
 Codes Comments Uncontrolled type 2 diabetes mellitus with stage 2 chronic kidney disease, without long-term current use of insulin (HCC)    -  Primary ICD-10-CM: E11.22, E11.65, N18.2 ICD-9-CM: 250.52, 585.2 Essential hypertension     ICD-10-CM: I10 
ICD-9-CM: 401.9 Mixed hyperlipidemia     ICD-10-CM: E78.2 ICD-9-CM: 272.2 Obesity, morbid ([address]     ICD-10-CM: E66.01 
ICD-9-CM: 278.01 Vitals BP Pulse Temp Resp Height(growth percentile) Weight(growth percentile) 150/70 (BP 1 Location: Left arm, BP Patient Position: Sitting) 80 97 °F (36.1 °C) (Oral) 18 5' 1\" (1.549 m) 213 lb 11.2 oz (96.9 kg) SpO2 BMI OB Status Smoking Status 96% 40.38 kg/m2 Unknown Never Smoker BMI and BSA Data Body Mass Index Body Surface Area  
 40.38 kg/m 2 2.04 m 2 Preferred Pharmacy Pharmacy Name Phone 99 [facility], [address] [place] [address] [employer] [phone] Your Updated Medication List  
  
   
This list is accurate as of 4/25/18  9:41 AM.  Always use your most recent med list. amLODIPine 10 mg tablet Commonly known as:  Deondre Micheal Take 1 Tab by mouth daily. STOP CARDIZEM  
  
 aspirin 81 mg tablet Take 81 mg by mouth. atorvastatin 40 mg tablet Commonly known as:  LIPITOR Take 1 Tab by mouth nightly. Stop 20 mg dose  
  
 carvedilol 25 mg tablet Commonly known as:  Kristie Menghini Take 1 Tab by mouth two (2) times daily (with meals). STOP COREG CR  
  
 glucose blood VI test strips strip Commonly known as:  ONE TOUCH TEST  
100 Each by Does Not Apply route two (2) times a day. Dx. Code 250.02  
  
 linagliptin 5 mg tablet Commonly known as:  Perfecto Dunn Take 1 Tab by mouth daily. STOP ONGLYZA  
  
 loratadine 10 mg tablet Commonly known as:  Jennifer Galea Take 10 mg by mouth.  
  
 metFORMIN 500 mg tablet Commonly known as:  GLUCOPHAGE  
TAKE 1 TABLET BY MOUTH TWICE DAILY WITH MEAL We Performed the Following  DIABETES FOOT EXAM [7 Custom] Follow-up Instructions Return in about 6 months (around 10/25/2018). Patient Instructions   
 
lipitor  40 mg at night  
 
 
tradjenta 5 mg a day Metformin 500 mg    Plain,   twice a day with meals  
 
norvasc 10 mg a day   
 
coreg 25 mg  One pill  twice a day Introducing \[facility]\"" & HEALTH SERVICES! Dear Cristy Galeano: Thank you for requesting a [employer] account.   Our records indicate that you already have an active [employer] Online account. You can access your account anytime at [email or web address] Did you know that you can access your hospital and ER discharge instructions at any time in [employer]? You can also review all of your test results from your hospital stay or ER visit. Additional Information If you have questions, please visit the Frequently Asked Questions section of the [employer] Online website at [email or web address] Remember, [employer] Online is NOT to be used for urgent needs. For medical emergencies, dial 911. Now available from your iPhone and Android! Please provide this summary of care documentation to your next provider. Your primary care clinician is listed as Jaya Humphrey. If you have any questions after today's visit, please call [phone].

## 2018-04-25 NOTE — PATIENT INSTRUCTIONS
lipitor  40 mg at night       tradjenta 5 mg a day     Metformin 500 mg    Plain,   twice a day with meals     norvasc 10 mg a day      coreg 25 mg  One pill  twice a day

## 2018-04-25 NOTE — PROGRESS NOTES
Chief Complaint   Patient presents with    Diabetes     1. Have you been to the ER, urgent care clinic since your last visit? Hospitalized since your last visit? No    2. Have you seen or consulted any other health care providers outside of the [address] [place] [address] since your last visit? Include any pap smears or colon screening.  No    Wt Readings from Last 3 Encounters:   04/25/18 213 lb 11.2 oz (96.9 kg)   02/08/18 205 lb (93 kg)   12/11/17 216 lb 6.4 oz (98.2 kg)     Temp Readings from Last 3 Encounters:   04/25/18 97 °F (36.1 °C) (Oral)   02/08/18 97.9 °F (36.6 °C) (Oral)   12/11/17 96.2 °F (35.7 °C) (Oral)     BP Readings from Last 3 Encounters:   04/25/18 150/70   02/08/18 164/86   12/11/17 151/78     Pulse Readings from Last 3 Encounters:   04/25/18 80   02/08/18 94   12/11/17 82

## 2018-05-24 DIAGNOSIS — I10 ESSENTIAL HYPERTENSION: ICD-10-CM

## 2018-05-24 DIAGNOSIS — E78.2 MIXED HYPERLIPIDEMIA: ICD-10-CM

## 2018-05-24 RX ORDER — ATORVASTATIN CALCIUM 40 MG/1
TABLET, FILM COATED ORAL
Qty: 90 TAB | Refills: 5 | Status: SHIPPED | OUTPATIENT
Start: 2018-05-24 | End: 2019-08-13 | Stop reason: SDUPTHER

## 2018-10-25 ENCOUNTER — HOSPITAL ENCOUNTER (OUTPATIENT)
Dept: LAB | Age: 68
Discharge: HOME | End: 2018-10-25
Payer: MEDICARE

## 2018-10-25 PROCEDURE — 83036 HEMOGLOBIN GLYCOSYLATED A1C: CPT

## 2018-10-25 PROCEDURE — 80061 LIPID PANEL: CPT

## 2018-10-25 PROCEDURE — 80053 COMPREHEN METABOLIC PANEL: CPT

## 2018-10-25 PROCEDURE — 36415 COLL VENOUS BLD VENIPUNCTURE: CPT

## 2018-10-25 PROCEDURE — 82043 UR ALBUMIN QUANTITATIVE: CPT

## 2018-11-01 ENCOUNTER — OFFICE VISIT (OUTPATIENT)
Dept: ENDOCRINOLOGY | Age: 68
End: 2018-11-01

## 2018-11-01 VITALS
SYSTOLIC BLOOD PRESSURE: 136 MMHG | HEIGHT: 61 IN | HEART RATE: 99 BPM | WEIGHT: 218.3 LBS | DIASTOLIC BLOOD PRESSURE: 66 MMHG | RESPIRATION RATE: 16 BRPM | TEMPERATURE: 96.9 F | BODY MASS INDEX: 41.22 KG/M2 | OXYGEN SATURATION: 97 %

## 2018-11-01 DIAGNOSIS — E66.01 OBESITY, MORBID (HCC): ICD-10-CM

## 2018-11-01 DIAGNOSIS — I10 ESSENTIAL HYPERTENSION: ICD-10-CM

## 2018-11-01 DIAGNOSIS — E78.2 MIXED HYPERLIPIDEMIA: ICD-10-CM

## 2018-11-01 NOTE — PROGRESS NOTES
.1. Have you been to the ER, urgent care clinic since your last visit? Hospitalized since your last visit? No    2. Have you seen or consulted any other health care providers outside of the [address] [place] [address] since your last visit? Include any pap smears or colon screening.  No     Lab Results   Component Value Date/Time    Hemoglobin A1c 6.3 (H) 10/25/2018 09:08 AM    Hemoglobin A1c (POC) 5.9 02/28/2014 12:11 PM      Wt Readings from Last 3 Encounters:   11/01/18 218 lb 4.8 oz (99 kg)   04/25/18 213 lb 11.2 oz (96.9 kg)   02/08/18 205 lb (93 kg)     Temp Readings from Last 3 Encounters:   11/01/18 96.9 °F (36.1 °C) (Oral)   04/25/18 97 °F (36.1 °C) (Oral)   02/08/18 97.9 °F (36.6 °C) (Oral)     BP Readings from Last 3 Encounters:   11/01/18 136/66   04/25/18 150/70   02/08/18 164/86     Pulse Readings from Last 3 Encounters:   11/01/18 99   04/25/18 80   02/08/18 94

## 2018-11-01 NOTE — PROGRESS NOTES
HISTORY OF PRESENT ILLNESS    Dane Medley is a 79  y.o. female. HPI,   Patient here for f/u after last  visit for  Type 2 diabetes mellitus  From April 2018      Gained 3 lbs   Log is good     She had no complaints in between        Old history     Her rash is cleared   Pt is good with cost of medications     She is doing well   Gained 8 lbs as she recovered the sickness         Old history     She got hospitalized with severe allergic reaction,  And got released  y- day   From the hospital      She had to change to onglyza and cartia from tradjenta and coreg cr  3 weeks ago   She developed skin rash   A week ago   She is now on tapering doses of prednisone      She is  compliant with diet   she is able to tolerate metformin and tradjenta  She has been doing well    She is in donut hole (paying out of pocket )  She feels fine  Gained 1 lbs   Reports no post prandial pain at all   Good range of  blood sugars       Old history : .   Current A1C is 6.7 % today, compared to [facility]- over 10 %   Current diabetic medications include janumet and amaryl 2mg a day. accompanied by her sister, who is a cook   Current monitoring regimen: home blood tests - 2-3 times daily   Home blood sugar records: trend: stable   Any episodes of hypoglycemia? no   Weight trend: stable   Prior visit with dietician: yes -     Review of Systems   Constitutional: Negative. HENT: Negative. Eyes: Negative for pain and redness. Respiratory: Negative. Cardiovascular: Negative for chest pain, palpitations and leg swelling. Gastrointestinal: Negative. Negative for constipation. Genitourinary: Negative. Musculoskeletal: Negative for myalgias. Skin: Negative. Neurological: Negative. Endo/Heme/Allergies: Negative. Psychiatric/Behavioral: Negative for depression and memory loss. The patient does not have insomnia. Physical Exam   Constitutional: She is oriented to person, place, and time.  She appears well-developed and well-nourished. HENT:   Head: Normocephalic. Eyes: Conjunctivae and EOM are normal. Pupils are equal, round, and reactive to light. Neck: Normal range of motion. Neck supple. No JVD present. No tracheal deviation present. No thyromegaly present. Cardiovascular: Normal rate, regular rhythm and normal heart sounds. No murmur heard. Pulmonary/Chest: Breath sounds normal.   Abdominal: Soft. Bowel sounds are normal.   Musculoskeletal: Normal range of motion. Lymphadenopathy:   She has no cervical adenopathy. Neurological: She is alert and oriented to person, place, and time. She has normal reflexes. Skin: Skin is warm. Psychiatric: She has a normal mood and affect. Diabetic foot exam: April 2018      Left: Reflexes nd     Vibratory sensation normal    Proprioception nd   Sharp/dull discrimination nd    Filament test normal sensation with micro filament   Pulse DP: 2+ (normal)   Pulse PT: nd   Deformities: None  Right: Reflexes nd   Vibratory sensation normal   Proprioception nd   Sharp/dull discrimination nd   Filament test normal sensation with micro filament   Pulse DP: 2+ (normal)   Pulse PT: nd   Deformities: None        Lab Results   Component Value Date/Time    Hemoglobin A1c 6.3 (H) 10/25/2018 09:08 AM    Hemoglobin A1c 6.4 (H) 04/11/2018 12:01 PM    Hemoglobin A1c 6.3 (H) 12/04/2017 09:51 AM    Microalb/Creat ratio (ug/mg creat.) 16.5 10/25/2018 09:08 AM    LDL, calculated 104 (H) 10/25/2018 09:08 AM    Creatinine 1.27 (H) 10/25/2018 09:08 AM      Lab Results   Component Value Date/Time    Cholesterol, total 164 10/25/2018 09:08 AM    HDL Cholesterol 39 (L) 10/25/2018 09:08 AM    LDL, calculated 104 (H) 10/25/2018 09:08 AM    Triglyceride 104 10/25/2018 09:08 AM     Lab Results   Component Value Date/Time    AST (SGOT) 15 10/25/2018 09:08 AM    Alk.  phosphatase 116 10/25/2018 09:08 AM     Lab Results   Component Value Date/Time    GFR est AA 51 (L) 10/25/2018 09:08 AM GFR est non-AA 44 (L) 10/25/2018 09:08 AM    Creatinine 1.27 (H) 10/25/2018 09:08 AM    BUN 21 10/25/2018 09:08 AM    Sodium 142 10/25/2018 09:08 AM    Potassium 3.9 10/25/2018 09:08 AM    Chloride 104 10/25/2018 09:08 AM    CO2 23 10/25/2018 09:08 AM          ASSESSMENT and PLAN      1. Type 2 DM uncontrolled : A1c is   6.3 %     From oct 2018    comapred to   6.4 %        From    April 2018    comapred to   6.3 %     From dec 2017    compared to  6.6 %      From June 2017   Compared to   6.4 %   From dec 2016   Compared to    6.3 %      From  May 2016    Compared to  6.3 %    From   Nov 2015  Compared to    6.4 %        From aug 2015  Compared to 6.3 %   From May  2015 compared to  6.5  %     From feb 2015 compared to  6.1 %  From aug 2014 compared to   5.9 % from feb 2014 compared to 6.2 % from oct 2013 compared to   6.7 % from July 203 compared to  5.9 % from march 6 2013 compared to 6.7 % jan 2013 compared to 10 % from nov 2012     plateaued good   control   She has been maintaining  good  glycemic control on current regimen  Was doing very well on tradjenta and  metformin 500 mg  Bid , low dose being used with  caution for renal insufficiency  as her creat is fluctuating   Changed to onglyza  from [address], but she developed severe allergy to  onglyza   She would liek to go back to tradjents     She  is advised to check blood sugars one times daily by rotation method. reviewed medications and side effects in detail   lab results and schedule of future lab studies reviewed with patient       2. Hypoglycemia : should not occur on current regimen    3.  HTN : well controlled, on norvasc and coreg cr 40 mg   Off  exforge 160/10 once a day for elevated creat levels     I chose  cardizem and stopped coreg cr and norvasc for cost reasons   Coreg cr was pretty expensive for her  as she was in donut hole    She turned out to be allergic to [facility]     She wanted to go for coreg 25 mg bid and norvasc 10 mg , as these are her discharge meds     As am afraid addition of diuretic could worsen renal insufficiency and would make metformin to go out of her care    Patient is educated about importance of compliance with anti-hypertensives especially ARB/ACEI     4. Dyslipidemia : lipids  Are still over the edge  on lipitor 40 mg hs   Patient is educated about benefits and adverse effects of statins and explained how benefits outweigh risk. 5. use of aspirin to prevent MI and TIA's discussed     6.,h/o right foot diabetic ulcer, fourth toe -- resolved ,  And that was when she found out to be diabetic - October 2012   PAD test result   From [facility]    ( podiatrist : Dr. Emilie Washington )      7.  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

## 2019-02-26 RX ORDER — METFORMIN HYDROCHLORIDE 500 MG/1
TABLET ORAL
Qty: 180 TAB | Refills: 0 | Status: SHIPPED | OUTPATIENT
Start: 2019-02-26 | End: 2019-05-08 | Stop reason: SDUPTHER

## 2019-04-21 RX ORDER — AMLODIPINE BESYLATE 10 MG/1
TABLET ORAL
Qty: 30 TAB | Refills: 6 | Status: SHIPPED | OUTPATIENT
Start: 2019-04-21 | End: 2019-11-06 | Stop reason: SDUPTHER

## 2019-05-01 ENCOUNTER — HOSPITAL ENCOUNTER (OUTPATIENT)
Dept: LAB | Age: 69
Discharge: HOME | End: 2019-05-01
Payer: MEDICARE

## 2019-05-01 DIAGNOSIS — E66.01 OBESITY, MORBID (HCC): ICD-10-CM

## 2019-05-01 DIAGNOSIS — I10 ESSENTIAL HYPERTENSION: ICD-10-CM

## 2019-05-01 DIAGNOSIS — E78.2 MIXED HYPERLIPIDEMIA: ICD-10-CM

## 2019-05-01 PROCEDURE — 80061 LIPID PANEL: CPT

## 2019-05-01 PROCEDURE — 83036 HEMOGLOBIN GLYCOSYLATED A1C: CPT

## 2019-05-01 PROCEDURE — 80053 COMPREHEN METABOLIC PANEL: CPT

## 2019-05-01 PROCEDURE — 82043 UR ALBUMIN QUANTITATIVE: CPT

## 2019-05-01 PROCEDURE — 36415 COLL VENOUS BLD VENIPUNCTURE: CPT

## 2019-05-02 LAB
ALBUMIN SERPL-MCNC: 4.4 G/DL (ref 3.6–4.8)
ALBUMIN/CREAT UR: 12 MG/G CREAT (ref 0–30)
ALBUMIN/GLOB SERPL: 1.5 {RATIO} (ref 1.2–2.2)
ALP SERPL-CCNC: 115 IU/L (ref 39–117)
ALT SERPL-CCNC: 14 IU/L (ref 0–32)
AST SERPL-CCNC: 17 IU/L (ref 0–40)
BILIRUB SERPL-MCNC: 0.3 MG/DL (ref 0–1.2)
BUN SERPL-MCNC: 21 MG/DL (ref 8–27)
BUN/CREAT SERPL: 17 (ref 12–28)
CALCIUM SERPL-MCNC: 9.1 MG/DL (ref 8.7–10.3)
CHLORIDE SERPL-SCNC: 105 MMOL/L (ref 96–106)
CHOLEST SERPL-MCNC: 161 MG/DL (ref 100–199)
CO2 SERPL-SCNC: 21 MMOL/L (ref 20–29)
CREAT SERPL-MCNC: 1.21 MG/DL (ref 0.57–1)
CREAT UR-MCNC: 49 MG/DL
EST. AVERAGE GLUCOSE BLD GHB EST-MCNC: 134 MG/DL
GLOBULIN SER CALC-MCNC: 3 G/DL (ref 1.5–4.5)
GLUCOSE SERPL-MCNC: 90 MG/DL (ref 65–99)
HBA1C MFR BLD: 6.3 % (ref 4.8–5.6)
HDLC SERPL-MCNC: 48 MG/DL
INTERPRETATION, 910389: NORMAL
INTERPRETATION: NORMAL
LDLC SERPL CALC-MCNC: 98 MG/DL (ref 0–99)
Lab: NORMAL
MICROALBUMIN UR-MCNC: 5.9 UG/ML
PDF IMAGE, 910387: NORMAL
POTASSIUM SERPL-SCNC: 4.1 MMOL/L (ref 3.5–5.2)
PROT SERPL-MCNC: 7.4 G/DL (ref 6–8.5)
SODIUM SERPL-SCNC: 142 MMOL/L (ref 134–144)
TRIGL SERPL-MCNC: 75 MG/DL (ref 0–149)
VLDLC SERPL CALC-MCNC: 15 MG/DL (ref 5–40)

## 2019-05-08 ENCOUNTER — OFFICE VISIT (OUTPATIENT)
Dept: ENDOCRINOLOGY | Age: 69
End: 2019-05-08

## 2019-05-08 VITALS
BODY MASS INDEX: 42.16 KG/M2 | TEMPERATURE: 97.4 F | DIASTOLIC BLOOD PRESSURE: 77 MMHG | SYSTOLIC BLOOD PRESSURE: 128 MMHG | OXYGEN SATURATION: 99 % | RESPIRATION RATE: 18 BRPM | HEART RATE: 85 BPM | HEIGHT: 61 IN | WEIGHT: 223.3 LBS

## 2019-05-08 DIAGNOSIS — I10 ESSENTIAL HYPERTENSION: ICD-10-CM

## 2019-05-08 DIAGNOSIS — E78.2 MIXED HYPERLIPIDEMIA: ICD-10-CM

## 2019-05-08 RX ORDER — CARVEDILOL 25 MG/1
TABLET ORAL
Qty: 60 TAB | Refills: 6 | Status: SHIPPED | OUTPATIENT
Start: 2019-05-08 | End: 2019-11-06 | Stop reason: SDUPTHER

## 2019-05-08 RX ORDER — METFORMIN HYDROCHLORIDE 500 MG/1
TABLET ORAL
Qty: 180 TAB | Refills: 4 | Status: SHIPPED | OUTPATIENT
Start: 2019-05-08 | End: 2020-08-19

## 2019-05-08 NOTE — LETTER
5/8/19 Patient: Leobardo Seip YOB: 1950 Date of Visit: 5/8/2019 Hailee Fabian MD 
[id number] [id number] VIA Facsimile: [phone] Dear Hailee Fabian MD, Thank you for referring Ms. Ciera Serra to [id number] [place] [address] for evaluation. My notes for this consultation are attached. If you have questions, please do not hesitate to call me. I look forward to following your patient along with you. Sincerely, Heaven Greene MD

## 2019-05-08 NOTE — PROGRESS NOTES
1. Have you been to the ER, urgent care clinic since your last visit? No  Hospitalized since your last visit? No 
 
2. Have you seen or consulted any other health care providers outside of the [address] [place] [address] since your last visit? Include any pap smears or colon screening. No 
 
Wt Readings from Last 3 Encounters:  
05/08/19 223 lb 4.8 oz (101.3 kg) 11/01/18 218 lb 4.8 oz (99 kg) 04/25/18 213 lb 11.2 oz (96.9 kg) Temp Readings from Last 3 Encounters:  
05/08/19 97.4 °F (36.3 °C) (Oral) 11/01/18 96.9 °F (36.1 °C) (Oral) 04/25/18 97 °F (36.1 °C) (Oral) BP Readings from Last 3 Encounters:  
05/08/19 128/77  
11/01/18 136/66  
04/25/18 150/70 Pulse Readings from Last 3 Encounters:  
05/08/19 85  
11/01/18 99  
04/25/18 80 Lab Results Component Value Date/Time Hemoglobin A1c 6.3 (H) 05/01/2019 09:46 AM  
 Hemoglobin A1c (POC) 5.9 02/28/2014 12:11 PM  
 
Patient has meter today.

## 2019-05-08 NOTE — PROGRESS NOTES
HISTORY OF PRESENT ILLNESS 
 
Sarah Carr is a 79  y.o. female. HPI, Patient here for f/u after last  visit for  Type 2 diabetes mellitus  From November 2018 Gained 5 lbs Log is good She had no complaints in between Old history Her rash is cleared Pt is good with cost of medications She is doing well Gained 8 lbs as she recovered the sickness Old history She got hospitalized with severe allergic reaction,  And got released  y- day From the hospital 
 
 
She had to change to onglyza and cartia from tradjenta and coreg cr  3 weeks ago She developed skin rash   A week ago She is now on tapering doses of prednisone She is  compliant with diet  
she is able to tolerate metformin and tradjenta She has been doing well She is in donut hole (paying out of pocket ) She feels fine Gained 1 lbs Reports no post prandial pain at all Good range of  blood sugars Old history : . Current A1C is 6.7 % today, compared to [facility]- over 10 % Current diabetic medications include janumet and amaryl 2mg a day. accompanied by her sister, who is a cook Current monitoring regimen: home blood tests - 2-3 times daily Home blood sugar records: trend: stable Any episodes of hypoglycemia? no  
Weight trend: stable Prior visit with dietician: yes - Review of Systems Constitutional: Negative. HENT: Negative. Eyes: Negative for pain and redness. Respiratory: Negative. Cardiovascular: Negative for chest pain, palpitations and leg swelling. Gastrointestinal: Negative. Negative for constipation. Genitourinary: Negative. Musculoskeletal: Negative for myalgias. Skin: Negative. Neurological: Negative. Endo/Heme/Allergies: Negative. Psychiatric/Behavioral: Negative for depression and memory loss. The patient does not have insomnia. Physical Exam  
Constitutional: She is oriented to person, place, and time.  She appears well-developed and well-nourished. HENT:  
Head: Normocephalic. Eyes: Conjunctivae and EOM are normal. Pupils are equal, round, and reactive to light. Neck: Normal range of motion. Neck supple. No JVD present. No tracheal deviation present. No thyromegaly present. Cardiovascular: Normal rate, regular rhythm and normal heart sounds. No murmur heard. Pulmonary/Chest: Breath sounds normal.  
Abdominal: Soft. Bowel sounds are normal.  
Musculoskeletal: Normal range of motion. Lymphadenopathy:  
She has no cervical adenopathy. Neurological: She is alert and oriented to person, place, and time. She has normal reflexes. Skin: Skin is warm. Psychiatric: She has a normal mood and affect. Diabetic foot exam: April 2018 Left: Reflexes nd Vibratory sensation normal  
 Proprioception nd Sharp/dull discrimination nd Filament test normal sensation with micro filament Pulse DP: 2+ (normal) Pulse PT: nd 
 Deformities: None Right: Reflexes nd Vibratory sensation normal 
 Proprioception nd Sharp/dull discrimination nd Filament test normal sensation with micro filament Pulse DP: 2+ (normal) Pulse PT: nd 
 Deformities: None Lab Results Component Value Date/Time Hemoglobin A1c 6.3 (H) 05/01/2019 09:46 AM  
 Hemoglobin A1c 6.3 (H) 10/25/2018 09:08 AM  
 Hemoglobin A1c 6.4 (H) 04/11/2018 12:01 PM  
 Microalb/Creat ratio (ug/mg creat.) 12.0 05/01/2019 09:46 AM  
 LDL, calculated 98 05/01/2019 09:46 AM  
 Creatinine 1.21 (H) 05/01/2019 09:46 AM  
  
Lab Results Component Value Date/Time Cholesterol, total 161 05/01/2019 09:46 AM  
 HDL Cholesterol 48 05/01/2019 09:46 AM  
 LDL, calculated 98 05/01/2019 09:46 AM  
 Triglyceride 75 05/01/2019 09:46 AM  
 
Lab Results Component Value Date/Time AST (SGOT) 17 05/01/2019 09:46 AM  
 Alk. phosphatase 115 05/01/2019 09:46 AM  
 
Lab Results Component Value Date/Time  GFR est AA 53 (L) 05/01/2019 09:46 AM  
 GFR est non-AA 46 (L) 05/01/2019 09:46 AM  
 Creatinine 1.21 (H) 05/01/2019 09:46 AM  
 BUN 21 05/01/2019 09:46 AM  
 Sodium 142 05/01/2019 09:46 AM  
 Potassium 4.1 05/01/2019 09:46 AM  
 Chloride 105 05/01/2019 09:46 AM  
 CO2 21 05/01/2019 09:46 AM  
  
 
 
ASSESSMENT and PLAN 1. Type 2 DM uncontrolled : A1c is  6.3 %      From  May 2019    Compared to    6.3 %     From oct 2018    comapred to   6.4 %        From    April 2018    comapred to   6.3 %     From dec 2017    compared to  6.6 %      From June 2017   Compared to   6.4 %   From dec 2016   Compared to    6.3 %      From  May 2016    Compared to  6.3 %    From   Nov 2015  Compared to    6.4 %        From aug 2015  Compared to 6.3 %   From May  2015 compared to  6.5  %      
 
plateaued good   control She has been maintaining  good  glycemic control on current regimen Was doing very well on tradjenta and  metformin 500 mg  Bid , low dose being used with  caution for renal insufficiency  as her creat is fluctuating Changed to onglyza  from [address], but she developed severe allergy to  onglyza She would liek to go back to tradjenta On tradjenta and metformin She  is advised to check blood sugars one times daily by rotation method. reviewed medications and side effects in detail  
lab results and schedule of future lab studies reviewed with patient 2. Hypoglycemia : should not occur on current regimen 3. HTN : well controlled, on norvasc and coreg  BID She turned out to be allergic to [facility] Acei/arb INCREASED HER CREAT 4. Dyslipidemia : lipids  Are good   on lipitor 40 mg hs  
Patient is educated about benefits and adverse effects of statins and explained how benefits outweigh risk. 5. use of aspirin to prevent MI and TIA's discussed 6.,h/o right foot diabetic ulcer, fourth toe -- resolved ,  And that was when she found out to be diabetic - October 2012 PAD test result   From [facility] ( podiatrist : Dr. Dennie Oh ) 7. 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 OBESITY  : Body mass index is 42.19 kg/m². INSISTED ON LOSING WEIGHT 10 LB BY NEXT VISIT Labs bnv in 6 months Patient voiced understanding her plan of care

## 2019-06-10 RX ORDER — CARVEDILOL 25 MG/1
TABLET ORAL
Qty: 60 TAB | Refills: 0 | Status: SHIPPED | OUTPATIENT
Start: 2019-06-10 | End: 2019-07-23 | Stop reason: SDUPTHER

## 2019-06-19 RX ORDER — LINAGLIPTIN 5 MG/1
TABLET, FILM COATED ORAL
Qty: 90 TAB | Refills: 0 | Status: SHIPPED | OUTPATIENT
Start: 2019-06-19 | End: 2019-09-12 | Stop reason: SDUPTHER

## 2019-07-23 RX ORDER — CARVEDILOL 25 MG/1
TABLET ORAL
Qty: 60 TAB | Refills: 0 | Status: SHIPPED | OUTPATIENT
Start: 2019-07-23 | End: 2019-11-06 | Stop reason: SDUPTHER

## 2019-08-13 DIAGNOSIS — I10 ESSENTIAL HYPERTENSION: ICD-10-CM

## 2019-08-13 DIAGNOSIS — E78.2 MIXED HYPERLIPIDEMIA: ICD-10-CM

## 2019-08-13 RX ORDER — ATORVASTATIN CALCIUM 40 MG/1
TABLET, FILM COATED ORAL
Qty: 90 TAB | Refills: 0 | Status: SHIPPED | OUTPATIENT
Start: 2019-08-13 | End: 2019-11-06 | Stop reason: SDUPTHER

## 2019-09-12 RX ORDER — LINAGLIPTIN 5 MG/1
TABLET, FILM COATED ORAL
Qty: 90 TAB | Refills: 0 | Status: SHIPPED | OUTPATIENT
Start: 2019-09-12 | End: 2019-11-06 | Stop reason: SDUPTHER

## 2019-11-06 ENCOUNTER — OFFICE VISIT (OUTPATIENT)
Dept: ENDOCRINOLOGY | Age: 69
End: 2019-11-06

## 2019-11-06 VITALS
HEIGHT: 61 IN | RESPIRATION RATE: 18 BRPM | HEART RATE: 85 BPM | OXYGEN SATURATION: 99 % | BODY MASS INDEX: 43.22 KG/M2 | SYSTOLIC BLOOD PRESSURE: 132 MMHG | TEMPERATURE: 97.1 F | DIASTOLIC BLOOD PRESSURE: 62 MMHG | WEIGHT: 228.9 LBS

## 2019-11-06 DIAGNOSIS — E78.2 MIXED HYPERLIPIDEMIA: ICD-10-CM

## 2019-11-06 DIAGNOSIS — I10 ESSENTIAL HYPERTENSION: ICD-10-CM

## 2019-11-06 DIAGNOSIS — E11.65 TYPE 2 DIABETES MELLITUS WITH HYPERGLYCEMIA, WITHOUT LONG-TERM CURRENT USE OF INSULIN (HCC): ICD-10-CM

## 2019-11-06 LAB — HBA1C MFR BLD HPLC: 6.5 %

## 2019-11-06 RX ORDER — AMLODIPINE BESYLATE 10 MG/1
TABLET ORAL
Qty: 90 TAB | Refills: 6 | Status: SHIPPED | OUTPATIENT
Start: 2019-11-06 | End: 2021-01-13 | Stop reason: SDUPTHER

## 2019-11-06 RX ORDER — AMLODIPINE BESYLATE 10 MG/1
TABLET ORAL
Qty: 30 TAB | Refills: 6 | Status: SHIPPED | OUTPATIENT
Start: 2019-11-06 | End: 2019-11-06 | Stop reason: SDUPTHER

## 2019-11-06 RX ORDER — CARVEDILOL 25 MG/1
TABLET ORAL
Qty: 60 TAB | Refills: 6 | Status: SHIPPED | OUTPATIENT
Start: 2019-11-06 | End: 2020-05-20 | Stop reason: SDUPTHER

## 2019-11-06 RX ORDER — ATORVASTATIN CALCIUM 40 MG/1
TABLET, FILM COATED ORAL
Qty: 90 TAB | Refills: 4 | Status: SHIPPED | OUTPATIENT
Start: 2019-11-06 | End: 2020-12-17 | Stop reason: SDUPTHER

## 2019-11-06 NOTE — ADDENDUM NOTE
Addended byQuentin Benavidez on: 12/11/2017 07:37 PM     Modules accepted: Orders Asc Procedure Text (C): After obtaining clear surgical margins the patient was sent to an ASC for surgical repair.  The patient understands they will receive post-surgical care and follow-up from the ASC physician.

## 2019-11-06 NOTE — PATIENT INSTRUCTIONS
lipitor  40 mg at night  
 
 
tradjenta 5 mg a day Metformin 500 mg    Plain,   twice a day with meals  
 
norvasc 10 mg a day   
 
coreg 25 mg  One pill  twice a day  
 
------------------------------------------------------------------------------------------------------------------------------------------------ Do not skip meals Do not eat in between meals Reduce carbs- pasta, rice, potatoes, bread Do not drink juices or sodas, even diet free Donot eat peanut butter Do not eat sugar free cookies and cakes Do not eat peaches, grapes, pineapples, oranges and fruit medleys

## 2019-11-06 NOTE — PROGRESS NOTES
1. Have you been to the ER, urgent care clinic since your last visit? No  Hospitalized since your last visit? No    2. Have you seen or consulted any other health care providers outside of the [address] [place] [address] since your last visit? Include any pap smears or colon screening. No       Wt Readings from Last 3 Encounters:   11/06/19 228 lb 14.4 oz (103.8 kg)   05/08/19 223 lb 4.8 oz (101.3 kg)   11/01/18 218 lb 4.8 oz (99 kg)     Temp Readings from Last 3 Encounters:   11/06/19 97.1 °F (36.2 °C) (Oral)   05/08/19 97.4 °F (36.3 °C) (Oral)   11/01/18 96.9 °F (36.1 °C) (Oral)     BP Readings from Last 3 Encounters:   11/06/19 132/62   05/08/19 128/77   11/01/18 136/66     Pulse Readings from Last 3 Encounters:   11/06/19 85   05/08/19 85   11/01/18 99     Lab Results   Component Value Date/Time    Hemoglobin A1c 6.3 (H) 05/01/2019 09:46 AM    Hemoglobin A1c (POC) 5.9 02/28/2014 12:11 PM     Patient has meter today.

## 2019-11-06 NOTE — LETTER
11/6/19 Patient: Catrachita Dewitt YOB: 1950 Date of Visit: 11/6/2019 Jenni Fitzgerald MD 
[id number] [id number] VIA Facsimile: [phone] Dear Jenni Fitzgerald MD, Thank you for referring Ms. Walter Marion to [id number] [place] [address] for evaluation. My notes for this consultation are attached. If you have questions, please do not hesitate to call me. I look forward to following your patient along with you. Sincerely, Stella Degroot MD

## 2019-11-06 NOTE — PROGRESS NOTES
HISTORY OF PRESENT ILLNESS    Radha Galindo is a 81  y.o. female. HPI,   Patient here for f/u after last  visit for  Type 2 diabetes mellitus  From May 2019     Gained 5 lbs again   Log is good   She had no complaints in between        Old history     Her rash is cleared   Pt is good with cost of medications     She is doing well   Gained 8 lbs as she recovered the sickness         Old history     She got hospitalized with severe allergic reaction,  And got released  y- day   From the hospital      She had to change to onglyza and cartia from tradjenta and coreg cr  3 weeks ago   She developed skin rash   A week ago   She is now on tapering doses of prednisone    She is  compliant with diet   she is able to tolerate metformin and tradjenta  She has been doing well    She is in donut hole (paying out of pocket )  She feels fine  Gained 1 lbs   Reports no post prandial pain at all   Good range of  blood sugars       Old history : .   Current A1C is 6.7 % today, compared to [facility]- over 10 %   Current diabetic medications include janumet and amaryl 2mg a day. accompanied by her sister, who is a cook   Current monitoring regimen: home blood tests - 2-3 times daily   Home blood sugar records: trend: stable   Any episodes of hypoglycemia? no   Weight trend: stable   Prior visit with dietician: yes -     Review of Systems   Constitutional: Negative. HENT: Negative. Eyes: Negative for pain and redness. Respiratory: Negative. Cardiovascular: Negative for chest pain, palpitations and leg swelling. Gastrointestinal: Negative. Negative for constipation. Genitourinary: Negative. Musculoskeletal: Negative for myalgias. Skin: Negative. Neurological: Negative. Endo/Heme/Allergies: Negative. Psychiatric/Behavioral: Negative for depression and memory loss. The patient does not have insomnia. Physical Exam   Constitutional: She is oriented to person, place, and time.  She appears well-developed and well-nourished. HENT:   Head: Normocephalic. Eyes: Conjunctivae and EOM are normal. Pupils are equal, round, and reactive to light. Neck: Normal range of motion. Neck supple. No JVD present. No tracheal deviation present. No thyromegaly present. Cardiovascular: Normal rate, regular rhythm and normal heart sounds. No murmur heard. Pulmonary/Chest: Breath sounds normal.   Abdominal: Soft. Bowel sounds are normal.   Musculoskeletal: Normal range of motion. Lymphadenopathy:   She has no cervical adenopathy. Neurological: She is alert and oriented to person, place, and time. She has normal reflexes. Skin: Skin is warm. Psychiatric: She has a normal mood and affect. Diabetic foot exam: April 2018      Left: Reflexes nd     Vibratory sensation normal    Proprioception nd   Sharp/dull discrimination nd    Filament test normal sensation with micro filament   Pulse DP: 2+ (normal)   Pulse PT: nd   Deformities: None  Right: Reflexes nd   Vibratory sensation normal   Proprioception nd   Sharp/dull discrimination nd   Filament test normal sensation with micro filament   Pulse DP: 2+ (normal)   Pulse PT: nd   Deformities: None            ASSESSMENT and PLAN      1.   Type 2 DM uncontrolled : A1c is    6.5 %  By POC   Compared to  6.3 %      From  May 2019    Compared to    6.3 %     From oct 2018    comapred to   6.4 %        From    April 2018    comapred to   6.3 %     From dec 2017    compared to  6.6 %      From June 2017   Compared to   6.4 %   From dec 2016   Compared to    6.3 %      From  May 2016    Compared to  6.3 %    From   Nov 2015  Compared to    6.4 %        From aug 2015  Compared to 6.3 %   From May  2015 compared to  6.5  %         plateaued good   control   She has been maintaining  good  glycemic control on current regimen  Was doing very well on tradjenta and  metformin 500 mg  Bid , low dose being used with  caution for renal insufficiency  as her creat is fluctuating   Changed to onglyza  from [address], but she developed severe allergy to  onglyza   She would liek to go back to tradjenta       On tradjenta and metformin    She  is advised to check blood sugars one times daily by rotation method. reviewed medications and side effects in detail   lab results and schedule of future lab studies reviewed with patient       2. Hypoglycemia : should not occur on current regimen    3. HTN : well controlled, on norvasc and coreg  BID     She turned out to be allergic to CARTIA   Acei/arb INCREASED HER CREAT         4. Dyslipidemia : lipids  Are good   on lipitor 40 mg hs   Patient is educated about benefits and adverse effects of statins and explained how benefits outweigh risk. 5. use of aspirin to prevent MI and TIA's discussed     6.,h/o right foot diabetic ulcer, fourth toe -- resolved ,  And that was when she found out to be diabetic - October 2012   PAD test result   From [facility]    ( podiatrist : Dr. Myron Pacheco )      7. 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 OBESITY  : Body mass index is 43.25 kg/m².   INSISTED ON LOSING WEIGHT 10 LB BY NEXT VISIT  atleast       Labs bnv in 6 months   Patient voiced understanding her plan of care

## 2019-11-11 DIAGNOSIS — E78.2 MIXED HYPERLIPIDEMIA: ICD-10-CM

## 2019-11-11 DIAGNOSIS — I10 ESSENTIAL HYPERTENSION: ICD-10-CM

## 2019-11-11 RX ORDER — ATORVASTATIN CALCIUM 40 MG/1
TABLET, FILM COATED ORAL
Qty: 90 TAB | Refills: 0 | Status: SHIPPED | OUTPATIENT
Start: 2019-11-11 | End: 2020-05-20 | Stop reason: SDUPTHER

## 2019-11-11 RX ORDER — AMLODIPINE BESYLATE 10 MG/1
TABLET ORAL
Qty: 30 TAB | Refills: 0 | Status: SHIPPED | OUTPATIENT
Start: 2019-11-11 | End: 2020-03-25

## 2020-03-25 RX ORDER — AMLODIPINE BESYLATE 10 MG/1
TABLET ORAL
Qty: 90 TAB | Refills: 3 | Status: SHIPPED | OUTPATIENT
Start: 2020-03-25 | End: 2020-05-20 | Stop reason: SDUPTHER

## 2020-03-25 RX ORDER — AMLODIPINE BESYLATE 10 MG/1
TABLET ORAL
Qty: 30 TAB | Refills: 0 | Status: SHIPPED | OUTPATIENT
Start: 2020-03-25 | End: 2020-03-25

## 2020-04-15 ENCOUNTER — TELEPHONE (OUTPATIENT)
Dept: ENDOCRINOLOGY | Age: 70
End: 2020-04-15

## 2020-04-15 DIAGNOSIS — E78.2 MIXED HYPERLIPIDEMIA: ICD-10-CM

## 2020-04-15 DIAGNOSIS — I10 ESSENTIAL HYPERTENSION: ICD-10-CM

## 2020-04-15 NOTE — TELEPHONE ENCOUNTER
Order placed for pt per verbal order with read back from Dr. Margo Crawley 04/15/20    Lab slips mailed

## 2020-05-20 ENCOUNTER — VIRTUAL VISIT (OUTPATIENT)
Dept: ENDOCRINOLOGY | Age: 70
End: 2020-05-20

## 2020-05-20 DIAGNOSIS — I10 ESSENTIAL HYPERTENSION: ICD-10-CM

## 2020-05-20 DIAGNOSIS — E11.65 TYPE 2 DIABETES MELLITUS WITH HYPERGLYCEMIA, WITHOUT LONG-TERM CURRENT USE OF INSULIN (HCC): ICD-10-CM

## 2020-05-20 DIAGNOSIS — E78.2 MIXED HYPERLIPIDEMIA: ICD-10-CM

## 2020-05-20 RX ORDER — CARVEDILOL 25 MG/1
TABLET ORAL
Qty: 60 TAB | Refills: 6 | Status: SHIPPED | OUTPATIENT
Start: 2020-05-20 | End: 2020-11-17 | Stop reason: SDUPTHER

## 2020-05-20 NOTE — PROGRESS NOTES
1. Have you been to the ER, urgent care clinic since your last visit? No  Hospitalized since your last visit? No    2. Have you seen or consulted any other health care providers outside of the [address] [place] [address] since your last visit? Include any pap smears or colon screening.  No

## 2020-07-20 DIAGNOSIS — E11.65 TYPE 2 DIABETES MELLITUS WITH HYPERGLYCEMIA, WITHOUT LONG-TERM CURRENT USE OF INSULIN (HCC): ICD-10-CM

## 2020-07-20 RX ORDER — LINAGLIPTIN 5 MG/1
TABLET, FILM COATED ORAL
Qty: 30 TAB | Refills: 6 | Status: SHIPPED | OUTPATIENT
Start: 2020-07-20 | End: 2021-01-13 | Stop reason: SDUPTHER

## 2020-08-19 RX ORDER — METFORMIN HYDROCHLORIDE 500 MG/1
TABLET ORAL
Qty: 180 TAB | Refills: 4 | Status: SHIPPED | OUTPATIENT
Start: 2020-08-19 | End: 2021-01-13 | Stop reason: SDUPTHER

## 2020-11-17 RX ORDER — CARVEDILOL 25 MG/1
TABLET ORAL
Qty: 60 TAB | Refills: 6 | Status: SHIPPED | OUTPATIENT
Start: 2020-11-17 | End: 2021-01-13 | Stop reason: SDUPTHER

## 2020-11-20 DIAGNOSIS — I10 ESSENTIAL HYPERTENSION: ICD-10-CM

## 2020-11-20 DIAGNOSIS — E78.2 MIXED HYPERLIPIDEMIA: ICD-10-CM

## 2020-12-17 DIAGNOSIS — E78.2 MIXED HYPERLIPIDEMIA: ICD-10-CM

## 2020-12-17 DIAGNOSIS — I10 ESSENTIAL HYPERTENSION: ICD-10-CM

## 2020-12-17 RX ORDER — ATORVASTATIN CALCIUM 40 MG/1
TABLET, FILM COATED ORAL
Qty: 90 TAB | Refills: 4 | Status: SHIPPED | OUTPATIENT
Start: 2020-12-17 | End: 2021-01-13 | Stop reason: SDUPTHER

## 2021-01-07 LAB
ALBUMIN SERPL-MCNC: 4 G/DL (ref 3.5–5)
ALBUMIN/GLOB SERPL: 1 {RATIO} (ref 1.1–2.2)
ALP SERPL-CCNC: 137 U/L (ref 45–117)
ALT SERPL-CCNC: 22 U/L (ref 12–78)
ANION GAP SERPL CALC-SCNC: 6 MMOL/L (ref 5–15)
AST SERPL-CCNC: 13 U/L (ref 15–37)
BILIRUB SERPL-MCNC: 0.4 MG/DL (ref 0.2–1)
BUN SERPL-MCNC: 19 MG/DL (ref 6–20)
BUN/CREAT SERPL: 15 (ref 12–20)
CALCIUM SERPL-MCNC: 9.7 MG/DL (ref 8.5–10.1)
CHLORIDE SERPL-SCNC: 109 MMOL/L (ref 97–108)
CHOLEST SERPL-MCNC: 181 MG/DL
CO2 SERPL-SCNC: 26 MMOL/L (ref 21–32)
CREAT SERPL-MCNC: 1.27 MG/DL (ref 0.55–1.02)
CREAT UR-MCNC: 60.7 MG/DL
EST. AVERAGE GLUCOSE BLD GHB EST-MCNC: 123 MG/DL
GLOBULIN SER CALC-MCNC: 4 G/DL (ref 2–4)
GLUCOSE SERPL-MCNC: 109 MG/DL (ref 65–100)
HBA1C MFR BLD: 5.9 % (ref 4–5.6)
HDLC SERPL-MCNC: 43 MG/DL
HDLC SERPL: 4.2 {RATIO} (ref 0–5)
LDLC SERPL CALC-MCNC: 119.8 MG/DL (ref 0–100)
LIPID PROFILE,FLP: ABNORMAL
MICROALBUMIN UR-MCNC: 0.91 MG/DL
MICROALBUMIN/CREAT UR-RTO: 15 MG/G (ref 0–30)
POTASSIUM SERPL-SCNC: 4.2 MMOL/L (ref 3.5–5.1)
PROT SERPL-MCNC: 8 G/DL (ref 6.4–8.2)
SODIUM SERPL-SCNC: 141 MMOL/L (ref 136–145)
TRIGL SERPL-MCNC: 91 MG/DL (ref ?–150)
VLDLC SERPL CALC-MCNC: 18.2 MG/DL

## 2021-01-13 ENCOUNTER — OFFICE VISIT (OUTPATIENT)
Dept: ENDOCRINOLOGY | Age: 71
End: 2021-01-13
Payer: MEDICARE

## 2021-01-13 VITALS
HEART RATE: 84 BPM | WEIGHT: 215 LBS | SYSTOLIC BLOOD PRESSURE: 141 MMHG | BODY MASS INDEX: 40.59 KG/M2 | TEMPERATURE: 97.8 F | RESPIRATION RATE: 18 BRPM | DIASTOLIC BLOOD PRESSURE: 68 MMHG | OXYGEN SATURATION: 97 % | HEIGHT: 61 IN

## 2021-01-13 DIAGNOSIS — E78.2 MIXED HYPERLIPIDEMIA: ICD-10-CM

## 2021-01-13 DIAGNOSIS — I10 ESSENTIAL HYPERTENSION: ICD-10-CM

## 2021-01-13 DIAGNOSIS — E11.65 TYPE 2 DIABETES MELLITUS WITH HYPERGLYCEMIA, WITHOUT LONG-TERM CURRENT USE OF INSULIN (HCC): ICD-10-CM

## 2021-01-13 PROCEDURE — G8753 SYS BP > OR = 140: HCPCS | Performed by: INTERNAL MEDICINE

## 2021-01-13 PROCEDURE — 1101F PT FALLS ASSESS-DOCD LE1/YR: CPT | Performed by: INTERNAL MEDICINE

## 2021-01-13 PROCEDURE — G8536 NO DOC ELDER MAL SCRN: HCPCS | Performed by: INTERNAL MEDICINE

## 2021-01-13 PROCEDURE — G8427 DOCREV CUR MEDS BY ELIG CLIN: HCPCS | Performed by: INTERNAL MEDICINE

## 2021-01-13 PROCEDURE — 3017F COLORECTAL CA SCREEN DOC REV: CPT | Performed by: INTERNAL MEDICINE

## 2021-01-13 PROCEDURE — G8417 CALC BMI ABV UP PARAM F/U: HCPCS | Performed by: INTERNAL MEDICINE

## 2021-01-13 PROCEDURE — G8754 DIAS BP LESS 90: HCPCS | Performed by: INTERNAL MEDICINE

## 2021-01-13 PROCEDURE — 3044F HG A1C LEVEL LT 7.0%: CPT | Performed by: INTERNAL MEDICINE

## 2021-01-13 PROCEDURE — 1090F PRES/ABSN URINE INCON ASSESS: CPT | Performed by: INTERNAL MEDICINE

## 2021-01-13 PROCEDURE — 2022F DILAT RTA XM EVC RTNOPTHY: CPT | Performed by: INTERNAL MEDICINE

## 2021-01-13 PROCEDURE — G8510 SCR DEP NEG, NO PLAN REQD: HCPCS | Performed by: INTERNAL MEDICINE

## 2021-01-13 PROCEDURE — G8400 PT W/DXA NO RESULTS DOC: HCPCS | Performed by: INTERNAL MEDICINE

## 2021-01-13 PROCEDURE — 99214 OFFICE O/P EST MOD 30 MIN: CPT | Performed by: INTERNAL MEDICINE

## 2021-01-13 RX ORDER — AMLODIPINE BESYLATE 10 MG/1
TABLET ORAL
Qty: 90 TAB | Refills: 4 | Status: SHIPPED | OUTPATIENT
Start: 2021-01-13 | End: 2022-01-14 | Stop reason: SDUPTHER

## 2021-01-13 RX ORDER — ATORVASTATIN CALCIUM 40 MG/1
TABLET, FILM COATED ORAL
Qty: 90 TAB | Refills: 4 | Status: SHIPPED | OUTPATIENT
Start: 2021-01-13 | End: 2021-09-01

## 2021-01-13 RX ORDER — METFORMIN HYDROCHLORIDE 500 MG/1
TABLET ORAL
Qty: 180 TAB | Refills: 4 | Status: SHIPPED | OUTPATIENT
Start: 2021-01-13 | End: 2022-01-14 | Stop reason: SDUPTHER

## 2021-01-13 RX ORDER — LINAGLIPTIN 5 MG/1
TABLET, FILM COATED ORAL
Qty: 90 TAB | Refills: 4 | Status: SHIPPED | OUTPATIENT
Start: 2021-01-13 | End: 2022-01-14 | Stop reason: SDUPTHER

## 2021-01-13 RX ORDER — CARVEDILOL 25 MG/1
TABLET ORAL
Qty: 180 TAB | Refills: 4 | Status: SHIPPED | OUTPATIENT
Start: 2021-01-13 | End: 2022-01-14 | Stop reason: SDUPTHER

## 2021-01-13 NOTE — PATIENT INSTRUCTIONS
SPECIFIC INSTRUCTIONS BELOW     lipitor  40 mg at night       tradjenta 5 mg a day     Metformin 500 mg    Plain,   twice a day with meals     norvasc 10 mg a day      coreg 25 mg  One pill  twice a day     ------------------------------------------------------------------------------------------------------------------------------------------------    Do not skip meals  Do not eat in between meals    Reduce carbs- pasta, rice, potatoes, bread   Do not drink juices or sodas, even diet free   Donot eat peanut butter     Do not eat sugar free cookies and cakes   Do not eat peaches, grapes, pineapples, oranges and fruit medleys           PAY ATTENTION TO THESE GENERAL INSTRUCTIONS     - HEALTH MAINTENANCE IS NOT GOING TO BE UP TO DATE ON YOUR AVS- PLEASE IGNORE   - YOUR MED LIST IS NOT UP TO DATE AS SOME CHANGES ARE BEING MADE AFTER THE VISIT - FOLLOW SPECIFIC INSTRUCTIONS ABOVE     Results     *Normal results will not be notified by a phone call starting January 1 2021   *If you have an upcoming visit, the results will be discussed at the visit   *Please sign up for MY CHART if you want access to your lab and test results  *Abnormal results which require immediate attention will be notified by phone call   *Abnormal results which do not require immediate assistance will be notified in 1-2 weeks       Refills    -    have your pharmacy send us a refill request  Phone calls  -  Allow  24 hrs.  for non-urgent calls to be returned  Prior authorization - It may take 2-4 weeks to process  Forms  -  FMLA, DMV etc., will take up to 2 weeks to process  Cancellations - please notify the office 2 days in advance   Samples  - will only be dispensed at visits     --------------------------------------------------------------------------------------------

## 2021-01-13 NOTE — PROGRESS NOTES
1. Have you been to the ER, urgent care clinic since your last visit?no  Hospitalized since your last visit? No    2. Have you seen or consulted any other health care providers outside of the [address] [place] [address] since your last visit? Include any pap smears or colon screening.  No    Wt Readings from Last 3 Encounters:   01/13/21 215 lb (97.5 kg)   11/06/19 228 lb 14.4 oz (103.8 kg)   05/08/19 223 lb 4.8 oz (101.3 kg)     Temp Readings from Last 3 Encounters:   01/13/21 97.8 °F (36.6 °C) (Oral)   11/06/19 97.1 °F (36.2 °C) (Oral)   05/08/19 97.4 °F (36.3 °C) (Oral)     BP Readings from Last 3 Encounters:   01/13/21 (!) 141/68   11/06/19 132/62   05/08/19 128/77     Pulse Readings from Last 3 Encounters:   01/13/21 84   11/06/19 85   05/08/19 85     Lab Results   Component Value Date/Time    Hemoglobin A1c 5.9 (H) 01/06/2021 10:13 AM    Hemoglobin A1c (POC) 6.5 11/06/2019 09:27 AM

## 2021-01-13 NOTE — LETTER
1/13/2021 Patient: Fredi Lai YOB: 1950 Date of Visit: 1/13/2021 Nataliya Morgan MD 
[id number] [phone] Via Fax: [phone] Dear Nataliya Morgan MD, Thank you for referring Ms. Meg Sagastume to [id number] [place] [address] for evaluation. My notes for this consultation are attached. If you have questions, please do not hesitate to call me. I look forward to following your patient along with you. Sincerely, Marika Kirk MD

## 2021-01-13 NOTE — PROGRESS NOTES
HISTORY OF PRESENT ILLNESS    Katelynn Hargrove is a 78   y.o. female. HPI,   Patient here for f/u after last  visit for  Type 2 diabetes mellitus  From June 2020    Lost 12 lbs   Doing TLC   Her sister is developing memory issues         June virtual visit     Lost 10 lbs , doing TLC   Sugars are fine       Old history     She got hospitalized with severe allergic reaction,  And got released  y- day   From the hospital  She had to change to [employer] and cartia from tradjenta and coreg cr  3 weeks ago   She developed skin rash   A week ago   She is now on tapering doses of prednisone  She is  compliant with diet   she is able to tolerate metformin and tradjenta  She has been doing well  She is in donut hole (paying out of pocket )  She feels fine  Gained 1 lbs   Reports no post prandial pain at all   Good range of  blood sugars       Old history : .   Current A1C is 6.7 % today, compared to [facility]- over 10 %   Current diabetic medications include janumet and amaryl 2mg a day. accompanied by her sister, who is a cook   Current monitoring regimen: home blood tests - 2-3 times daily   Home blood sugar records: trend: stable   Any episodes of hypoglycemia? no   Weight trend: stable   Prior visit with dietician: yes -     Review of Systems   Constitutional: Negative. HENT: Negative. Eyes: Negative for pain and redness. Respiratory: Negative. Cardiovascular: Negative for chest pain, palpitations and leg swelling. Gastrointestinal: Negative. Negative for constipation. Genitourinary: Negative. Musculoskeletal: Negative for myalgias. Skin: Negative. Neurological: Negative. Endo/Heme/Allergies: Negative. Psychiatric/Behavioral: Negative for depression and memory loss. The patient does not have insomnia. Physical Exam   Constitutional: oriented to person, place, and time. appears well-developed and well-nourished. HENT:   Head: Normocephalic.    Eyes: normal , noted no swelling or redness. Neck: Normal range of motion. Cardiovascular: could nto be examined  Pulmonary/Chest: appears breathing effortlessly  Abdominal: Soft. Musculoskeletal: appears relatively nprmal  range of motion. Neurological: He is alert and oriented to person, place, and time. Appears to have no focal deficits    Psychiatric: He has a normal mood and affect. Lab Results   Component Value Date/Time    Hemoglobin A1c 5.9 (H) 01/06/2021 10:13 AM    Hemoglobin A1c 6.3 (H) 05/01/2019 09:46 AM    Hemoglobin A1c 6.3 (H) 10/25/2018 09:08 AM    Glucose 109 (H) 01/06/2021 10:13 AM    Glucose  05/26/2015 11:40 AM    Microalbumin/Creat ratio (mg/g creat) 15 01/06/2021 10:13 AM    Microalbumin,urine random 0.91 01/06/2021 10:13 AM    LDL, calculated 119.8 (H) 01/06/2021 10:13 AM    Creatinine 1.27 (H) 01/06/2021 10:13 AM      Lab Results   Component Value Date/Time    Cholesterol, total 181 01/06/2021 10:13 AM    HDL Cholesterol 43 01/06/2021 10:13 AM    LDL, calculated 119.8 (H) 01/06/2021 10:13 AM    Triglyceride 91 01/06/2021 10:13 AM    CHOL/HDL Ratio 4.2 01/06/2021 10:13 AM     Lab Results   Component Value Date/Time    ALT (SGPT) 22 01/06/2021 10:13 AM    Alk. phosphatase 137 (H) 01/06/2021 10:13 AM    Bilirubin, total 0.4 01/06/2021 10:13 AM    Albumin 4.0 01/06/2021 10:13 AM    Protein, total 8.0 01/06/2021 10:13 AM     Lab Results   Component Value Date/Time    GFR est non-AA 43 (L) 01/06/2021 10:13 AM    GFR est AA 52 (L) 01/06/2021 10:13 AM    Creatinine 1.27 (H) 01/06/2021 10:13 AM    BUN 19 01/06/2021 10:13 AM    Sodium 141 01/06/2021 10:13 AM    Potassium 4.2 01/06/2021 10:13 AM    Chloride 109 (H) 01/06/2021 10:13 AM    CO2 26 01/06/2021 10:13 AM                 ASSESSMENT and PLAN      1.   Type 2 DM uncontrolled : A1c is  5.9 %   From jan 2021  Compared to    6.5 %  By POC   Compared to  6.3 %      From  May 2019    Compared to    6.3 %     From oct 2018    comapred to   6.4 % From    April 2018    comapred to   6.3 %     From dec 2017    compared to  6.6 %      From June 2017   Compared to   6.4 %   From dec 2016   Compared to    6.3 %      From  May 2016    Compared to  6.3 %    From   Nov 2015  Compared to    6.4 %        From aug 2015  Compared to 6.3 %   From May  2015 compared to  6.5  %       Jan 2021         May 2020         Log is good, no labs   On tradjenta and metformin  She has been maintaining  good  glycemic control on current regimen        Nov 2019   Was doing very well on tradjenta and  metformin 500 mg  Bid , low dose being used with  caution for renal insufficiency  as her creat is fluctuating   Changed to onglyza  from [address], but she developed severe allergy to  onglyza   She would liek to go back to tradjenta   She  is advised to check blood sugars one times daily by rotation method. 2. Hypoglycemia : should not occur on current regimen    3. HTN : well controlled, on norvasc and coreg  BID     She turned out to be allergic to CARTIA   Acei/arb INCREASED HER CREAT ( she does have vascular disease )  CT abd showed Atherosclerotic disease         4. Dyslipidemia : lipids  Are good   on lipitor 40 mg hs   Patient is educated about benefits and adverse effects of statins and explained how benefits outweigh risk. 5. use of aspirin to prevent MI and TIA's discussed     6.,h/o right foot diabetic ulcer, fourth toe -- resolved ,  And that was when she found out to be diabetic - October 2012   [facility] test result   [facility]    ( podiatrist : Dr. Dannielle Hall )      7.  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 OBESITY  : Body mass index is 40.62 kg/m².   INSISTED ON LOSING WEIGHT 10 LB BY NEXT VISIT  atleast   And SHE DID   Commended her           Labs bnv in 6 months   Patient voiced understanding her plan of care

## 2021-07-09 LAB
ALBUMIN SERPL-MCNC: 4.3 G/DL (ref 3.8–4.8)
ALBUMIN/CREAT UR: 15 MG/G CREAT (ref 0–29)
ALBUMIN/GLOB SERPL: 1.4 {RATIO} (ref 1.2–2.2)
ALP SERPL-CCNC: 135 IU/L (ref 48–121)
ALT SERPL-CCNC: 11 IU/L (ref 0–32)
AST SERPL-CCNC: 14 IU/L (ref 0–40)
BILIRUB SERPL-MCNC: <0.2 MG/DL (ref 0–1.2)
BUN SERPL-MCNC: 23 MG/DL (ref 8–27)
BUN/CREAT SERPL: 20 (ref 12–28)
CALCIUM SERPL-MCNC: 9.3 MG/DL (ref 8.7–10.3)
CHLORIDE SERPL-SCNC: 105 MMOL/L (ref 96–106)
CHOLEST SERPL-MCNC: 170 MG/DL (ref 100–199)
CO2 SERPL-SCNC: 20 MMOL/L (ref 20–29)
CREAT SERPL-MCNC: 1.17 MG/DL (ref 0.57–1)
CREAT UR-MCNC: 117.1 MG/DL
EST. AVERAGE GLUCOSE BLD GHB EST-MCNC: 137 MG/DL
GLOBULIN SER CALC-MCNC: 3.1 G/DL (ref 1.5–4.5)
GLUCOSE SERPL-MCNC: 113 MG/DL (ref 65–99)
HBA1C MFR BLD: 6.4 % (ref 4.8–5.6)
HDLC SERPL-MCNC: 45 MG/DL
IMP & REVIEW OF LAB RESULTS: NORMAL
INTERPRETATION: NORMAL
LDLC SERPL CALC-MCNC: 111 MG/DL (ref 0–99)
MICROALBUMIN UR-MCNC: 17 UG/ML
POTASSIUM SERPL-SCNC: 4.4 MMOL/L (ref 3.5–5.2)
PROT SERPL-MCNC: 7.4 G/DL (ref 6–8.5)
SODIUM SERPL-SCNC: 140 MMOL/L (ref 134–144)
TRIGL SERPL-MCNC: 75 MG/DL (ref 0–149)
VLDLC SERPL CALC-MCNC: 14 MG/DL (ref 5–40)

## 2021-07-14 ENCOUNTER — OFFICE VISIT (OUTPATIENT)
Dept: ENDOCRINOLOGY | Age: 71
End: 2021-07-14
Payer: MEDICARE

## 2021-07-14 VITALS
HEART RATE: 86 BPM | DIASTOLIC BLOOD PRESSURE: 67 MMHG | RESPIRATION RATE: 18 BRPM | TEMPERATURE: 97.5 F | WEIGHT: 220 LBS | OXYGEN SATURATION: 97 % | HEIGHT: 61 IN | BODY MASS INDEX: 41.54 KG/M2 | SYSTOLIC BLOOD PRESSURE: 137 MMHG

## 2021-07-14 DIAGNOSIS — E78.2 MIXED HYPERLIPIDEMIA: ICD-10-CM

## 2021-07-14 DIAGNOSIS — I10 ESSENTIAL HYPERTENSION: ICD-10-CM

## 2021-07-14 PROCEDURE — G8510 SCR DEP NEG, NO PLAN REQD: HCPCS | Performed by: INTERNAL MEDICINE

## 2021-07-14 PROCEDURE — 3017F COLORECTAL CA SCREEN DOC REV: CPT | Performed by: INTERNAL MEDICINE

## 2021-07-14 PROCEDURE — 99214 OFFICE O/P EST MOD 30 MIN: CPT | Performed by: INTERNAL MEDICINE

## 2021-07-14 PROCEDURE — G8417 CALC BMI ABV UP PARAM F/U: HCPCS | Performed by: INTERNAL MEDICINE

## 2021-07-14 PROCEDURE — G8427 DOCREV CUR MEDS BY ELIG CLIN: HCPCS | Performed by: INTERNAL MEDICINE

## 2021-07-14 PROCEDURE — G8754 DIAS BP LESS 90: HCPCS | Performed by: INTERNAL MEDICINE

## 2021-07-14 PROCEDURE — G8536 NO DOC ELDER MAL SCRN: HCPCS | Performed by: INTERNAL MEDICINE

## 2021-07-14 PROCEDURE — 3044F HG A1C LEVEL LT 7.0%: CPT | Performed by: INTERNAL MEDICINE

## 2021-07-14 PROCEDURE — G8400 PT W/DXA NO RESULTS DOC: HCPCS | Performed by: INTERNAL MEDICINE

## 2021-07-14 PROCEDURE — 2022F DILAT RTA XM EVC RTNOPTHY: CPT | Performed by: INTERNAL MEDICINE

## 2021-07-14 PROCEDURE — G8752 SYS BP LESS 140: HCPCS | Performed by: INTERNAL MEDICINE

## 2021-07-14 PROCEDURE — 1101F PT FALLS ASSESS-DOCD LE1/YR: CPT | Performed by: INTERNAL MEDICINE

## 2021-07-14 PROCEDURE — 1090F PRES/ABSN URINE INCON ASSESS: CPT | Performed by: INTERNAL MEDICINE

## 2021-07-14 NOTE — PROGRESS NOTES
HISTORY OF PRESENT ILLNESS    Dean Givens is a 73   y.o. female. HPI,   Patient here for f/u after last  visit for  Type 2 diabetes mellitus  From Jan 2021     No complaints       Jan 2021     Lost 12 lbs   Doing TLC   Her sister is developing memory issues         June virtual visit     Lost 10 lbs , doing TLC   Sugars are fine       Old history     She got hospitalized with severe allergic reaction,  And got released  y- day   From the hospital  She had to change to [employer] and cartia from tradjenta and coreg cr  3 weeks ago   She developed skin rash   A week ago   She is now on tapering doses of prednisone  She is  compliant with diet   s       Old history : .   Current A1C is 6.7 % today, compared to [facility]- over 10 %   Current diabetic medications include janumet and amaryl 2mg a day. accompanied by her sister, who is a cook   Current monitoring regimen: home blood tests - 2-3 times daily   Home blood sugar records: trend: stable   Any episodes of hypoglycemia? no   Weight trend: stable   Prior visit with dietician: yes -     Review of Systems   Constitutional: Negative. Psychiatric/Behavioral: Negative for depression and memory loss. The patient does not have insomnia. Physical Exam   Constitutional: oriented to person, place, and time. appears well-developed and well-nourished. Psychiatric: He has a normal mood and affect.          Lab Results   Component Value Date/Time    Hemoglobin A1c 6.4 (H) 07/07/2021 12:00 AM    Hemoglobin A1c 5.9 (H) 01/06/2021 10:13 AM    Hemoglobin A1c 6.3 (H) 05/01/2019 09:46 AM    Glucose 113 (H) 07/07/2021 12:00 AM    Glucose  05/26/2015 11:40 AM    Microalbumin/Creat ratio (mg/g creat) 15 01/06/2021 10:13 AM    Microalb/Creat ratio (ug/mg creat.) 15 07/07/2021 12:00 AM    Microalbumin,urine random 0.91 01/06/2021 10:13 AM    LDL, calculated 111 (H) 07/07/2021 12:00 AM    LDL, calculated 119.8 (H) 01/06/2021 10:13 AM    Creatinine 1.17 (H) 07/07/2021 12:00 AM      Lab Results   Component Value Date/Time    Cholesterol, total 170 07/07/2021 12:00 AM    HDL Cholesterol 45 07/07/2021 12:00 AM    LDL, calculated 111 (H) 07/07/2021 12:00 AM    LDL, calculated 119.8 (H) 01/06/2021 10:13 AM    Triglyceride 75 07/07/2021 12:00 AM    CHOL/HDL Ratio 4.2 01/06/2021 10:13 AM     Lab Results   Component Value Date/Time    ALT (SGPT) 11 07/07/2021 12:00 AM    Alk. phosphatase 135 (H) 07/07/2021 12:00 AM    Bilirubin, total <0.2 07/07/2021 12:00 AM    Albumin 4.3 07/07/2021 12:00 AM    Protein, total 7.4 07/07/2021 12:00 AM     Lab Results   Component Value Date/Time    GFR est non-AA 48 (L) 07/07/2021 12:00 AM    GFR est AA 55 (L) 07/07/2021 12:00 AM    Creatinine 1.17 (H) 07/07/2021 12:00 AM    BUN 23 07/07/2021 12:00 AM    Sodium 140 07/07/2021 12:00 AM    Potassium 4.4 07/07/2021 12:00 AM    Chloride 105 07/07/2021 12:00 AM    CO2 20 07/07/2021 12:00 AM                 ASSESSMENT and PLAN      1. Type 2 DM uncontrolled : A1c is  6.4 %      From    July 2021   Compared to   5.9 %   From jan 2021  Compared to    6.5 %  By POC   Compared to  6.3 %      From  May 2019    Compared to    6.3 %     From oct 2018    comapred to   6.4 %        From    April 2018    comapred to   6.3 %     From dec 2017    compared to  6.6 %      From June 2017 July 2021   Log is good, no labs   On tradjenta and metformin  She has been maintaining  good  glycemic control on current regimen          Nov 2019   Was doing very well on tradjenta and  metformin 500 mg  Bid , low dose being used with  caution for renal insufficiency  as her creat is fluctuating   Changed to onglyza  from [address], but she developed severe allergy to  onglyza   She would liek to go back to tradjenta   She  is advised to check blood sugars one times daily by rotation method. 2. Hypoglycemia : should not occur on current regimen    3.  HTN : well controlled, on norvasc and coreg  BID She turned out to be allergic to CARTIA   Acei/arb INCREASED HER CREAT ( she does have vascular disease )  CT abd showed Atherosclerotic disease         4. Dyslipidemia : lipids  Are off   on lipitor 40 mg hs   Patient is educated about benefits and adverse effects of statins and explained how benefits outweigh risk. 5. use of aspirin to prevent MI and TIA's discussed     6.,h/o right foot diabetic ulcer, fourth toe -- resolved ,  And that was when she found out to be diabetic - October 2012   PAD test result   From [facility]    ( podiatrist : Dr. Casper Miller )      7. 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 OBESITY  : Body mass index is 41.57 kg/m².   INSISTED ON LOSING WEIGHT 10 LB BY NEXT VISIT  atleast   And SHE DID   Commended her           Labs bnv in 6 months   Patient voiced understanding her plan of care

## 2021-07-14 NOTE — PATIENT INSTRUCTIONS
SPECIFIC INSTRUCTIONS BELOW         lipitor  40 mg at night       tradjenta 5 mg a day     Metformin 500 mg    Plain,   twice a day with meals     norvasc 10 mg a day      coreg 25 mg  One pill  twice a day     ------------------------------------------------------------------------------------------------------------------------------------------------    Do not skip meals  Do not eat in between meals    Reduce carbs- pasta, rice, potatoes, bread   Do not drink juices or sodas, even diet free   Donot eat peanut butter     Do not eat sugar free cookies and cakes   Do not eat peaches, grapes, pineapples, oranges and fruit medleys         -------------PAY ATTENTION TO THESE GENERAL INSTRUCTIONS -----------------    -ANY tests other than blood work, which you opt to do  outside the  [facility] imaging facilities, you are responsible for prior authorizations if  required    - HEALTH MAINTENANCE IS NOT GOING TO BE UP TO DATE ON YOUR AVS- PLEASE IGNORE   - YOUR MED LIST IS NOT UP TO DATE AS SOME CHANGES ARE BEING MADE AFTER THE VISIT - FOLLOW SPECIFIC INSTRUCTIONS  ABOVE     Results     *Normal results will not be notified by a phone call starting January 1 2021   *If you have an upcoming visit, the results will be discussed at the visit   *Please sign up for MY CHART if you want access to your lab and test results  *Abnormal results which require immediate attention will be notified by phone call   *Abnormal results which do not require immediate assistance will be notified in 1-2 weeks       Refills    -    have your pharmacy send us a refill request . Refills are done max for one year and a visit is a must before refills are extended    Follow up appointments -  highly encourage you to make it when you are checking out. We can accommodate you into the schedule based on your clinical situation, but not for extending refills beyond a year.  Labs are important to give refills and is important to get labs before the visit     Phone calls  -  Allow  24 hrs.  for non-urgent calls to be returned  Prior authorization - It may take 2-4 weeks to process  Forms  -  FMLA, DMV etc., will take up to 2 weeks to process  Cancellations - please notify the office 2 days in advance   Samples  - will only be dispensed at visits       If not showing for the appointments and cancelling appointments within 24 hours are kept track of and three  of such situations in  two consecutive years will likely be considered for termination from the practice    -------------------------------------------------------------------------------------------------------------------

## 2021-07-14 NOTE — PROGRESS NOTES
Room 3    Identified pt with two pt identifiers(name and ). Reviewed record in preparation for visit and have obtained necessary documentation. All patient medications has been reviewed. Chief Complaint   Patient presents with    Diabetes       3 most recent PHQ Screens 2021   Little interest or pleasure in doing things Not at all   Feeling down, depressed, irritable, or hopeless Not at all   Total Score PHQ 2 0     Abuse Screening Questionnaire 2021   Do you ever feel afraid of your partner? N   Are you in a relationship with someone who physically or mentally threatens you? N   Is it safe for you to go home? Y       Health Maintenance Review: Patient reminded of \"due or due soon\" health maintenance. I have asked the patient to contact his/her primary care provider (PCP) for follow-up on his/her health maintenance. Vitals:    21 0915   BP: 137/67   Pulse: 86   Resp: 18   Temp: 97.5 °F (36.4 °C)   TempSrc: Oral   SpO2: 97%   Weight: 220 lb (99.8 kg)   Height: 5' 1\" (1.549 m)   PainSc:   0 - No pain         Lab Results   Component Value Date/Time    Hemoglobin A1c 6.4 (H) 2021 12:00 AM    Hemoglobin A1c (POC) 6.5 2019 09:27 AM       Coordination of Care Questionnaire:   1) Have you been to an emergency room, urgent care, or hospitalized since your last visit?   no       2. Have seen or consulted any other health care provider since your last visit? NO      Patient is accompanied by self I have received verbal consent from Shawn Alonzo to discuss any/all medical information while they are present in the room.

## 2021-07-14 NOTE — LETTER
7/15/2021    Patient: Pinky Downey   YOB: 1950   Date of Visit: 7/14/2021     Rafael Medellin MD  [address] [address]  Via Fax: [phone]    Dear Rafael Medellin MD,      Thank you for referring Ms. Federico Cristobal to [address] [place] [address] for evaluation. My notes for this consultation are attached. If you have questions, please do not hesitate to call me. I look forward to following your patient along with you.       Sincerely,    Mikki Armstrong MD

## 2021-09-01 DIAGNOSIS — E78.2 MIXED HYPERLIPIDEMIA: ICD-10-CM

## 2021-09-01 DIAGNOSIS — I10 ESSENTIAL HYPERTENSION: ICD-10-CM

## 2021-09-01 RX ORDER — ATORVASTATIN CALCIUM 40 MG/1
TABLET, FILM COATED ORAL
Qty: 90 TABLET | Refills: 4 | Status: SHIPPED | OUTPATIENT
Start: 2021-09-01 | End: 2022-01-14 | Stop reason: SDUPTHER

## 2022-01-06 DIAGNOSIS — I10 ESSENTIAL HYPERTENSION: ICD-10-CM

## 2022-01-06 DIAGNOSIS — E78.2 MIXED HYPERLIPIDEMIA: ICD-10-CM

## 2022-01-07 LAB
ALBUMIN SERPL-MCNC: 4.2 G/DL (ref 3.8–4.8)
ALBUMIN/CREAT UR: 41 MG/G CREAT (ref 0–29)
ALBUMIN/GLOB SERPL: 1.3 {RATIO} (ref 1.2–2.2)
ALP SERPL-CCNC: 149 IU/L (ref 44–121)
ALT SERPL-CCNC: 13 IU/L (ref 0–32)
AST SERPL-CCNC: 16 IU/L (ref 0–40)
BILIRUB SERPL-MCNC: 0.2 MG/DL (ref 0–1.2)
BUN SERPL-MCNC: 24 MG/DL (ref 8–27)
BUN/CREAT SERPL: 20 (ref 12–28)
CALCIUM SERPL-MCNC: 9.4 MG/DL (ref 8.7–10.3)
CHLORIDE SERPL-SCNC: 107 MMOL/L (ref 96–106)
CHOLEST SERPL-MCNC: 173 MG/DL (ref 100–199)
CO2 SERPL-SCNC: 20 MMOL/L (ref 20–29)
CREAT SERPL-MCNC: 1.23 MG/DL (ref 0.57–1)
CREAT UR-MCNC: 62.5 MG/DL
EST. AVERAGE GLUCOSE BLD GHB EST-MCNC: 137 MG/DL
GLOBULIN SER CALC-MCNC: 3.2 G/DL (ref 1.5–4.5)
GLUCOSE SERPL-MCNC: 125 MG/DL (ref 65–99)
HBA1C MFR BLD: 6.4 % (ref 4.8–5.6)
HDLC SERPL-MCNC: 46 MG/DL
IMP & REVIEW OF LAB RESULTS: NORMAL
IMP & REVIEW OF LAB RESULTS: NORMAL
INTERPRETATION: NORMAL
LDLC SERPL CALC-MCNC: 113 MG/DL (ref 0–99)
MICROALBUMIN UR-MCNC: 25.8 UG/ML
POTASSIUM SERPL-SCNC: 4.4 MMOL/L (ref 3.5–5.2)
PROT SERPL-MCNC: 7.4 G/DL (ref 6–8.5)
SODIUM SERPL-SCNC: 141 MMOL/L (ref 134–144)
TRIGL SERPL-MCNC: 76 MG/DL (ref 0–149)
VLDLC SERPL CALC-MCNC: 14 MG/DL (ref 5–40)

## 2022-01-14 ENCOUNTER — OFFICE VISIT (OUTPATIENT)
Dept: ENDOCRINOLOGY | Age: 72
End: 2022-01-14
Payer: MEDICARE

## 2022-01-14 VITALS
OXYGEN SATURATION: 92 % | HEIGHT: 61 IN | SYSTOLIC BLOOD PRESSURE: 154 MMHG | TEMPERATURE: 98.1 F | RESPIRATION RATE: 16 BRPM | HEART RATE: 89 BPM | BODY MASS INDEX: 43.08 KG/M2 | WEIGHT: 228.2 LBS | DIASTOLIC BLOOD PRESSURE: 68 MMHG

## 2022-01-14 DIAGNOSIS — E78.2 MIXED HYPERLIPIDEMIA: ICD-10-CM

## 2022-01-14 DIAGNOSIS — I10 ESSENTIAL HYPERTENSION: ICD-10-CM

## 2022-01-14 DIAGNOSIS — E11.65 TYPE 2 DIABETES MELLITUS WITH HYPERGLYCEMIA, WITHOUT LONG-TERM CURRENT USE OF INSULIN (HCC): ICD-10-CM

## 2022-01-14 PROCEDURE — 3017F COLORECTAL CA SCREEN DOC REV: CPT | Performed by: INTERNAL MEDICINE

## 2022-01-14 PROCEDURE — 1101F PT FALLS ASSESS-DOCD LE1/YR: CPT | Performed by: INTERNAL MEDICINE

## 2022-01-14 PROCEDURE — 3044F HG A1C LEVEL LT 7.0%: CPT | Performed by: INTERNAL MEDICINE

## 2022-01-14 PROCEDURE — 1090F PRES/ABSN URINE INCON ASSESS: CPT | Performed by: INTERNAL MEDICINE

## 2022-01-14 PROCEDURE — G8400 PT W/DXA NO RESULTS DOC: HCPCS | Performed by: INTERNAL MEDICINE

## 2022-01-14 PROCEDURE — G8427 DOCREV CUR MEDS BY ELIG CLIN: HCPCS | Performed by: INTERNAL MEDICINE

## 2022-01-14 PROCEDURE — 2022F DILAT RTA XM EVC RTNOPTHY: CPT | Performed by: INTERNAL MEDICINE

## 2022-01-14 PROCEDURE — G8510 SCR DEP NEG, NO PLAN REQD: HCPCS | Performed by: INTERNAL MEDICINE

## 2022-01-14 PROCEDURE — G8536 NO DOC ELDER MAL SCRN: HCPCS | Performed by: INTERNAL MEDICINE

## 2022-01-14 PROCEDURE — G8417 CALC BMI ABV UP PARAM F/U: HCPCS | Performed by: INTERNAL MEDICINE

## 2022-01-14 PROCEDURE — G8754 DIAS BP LESS 90: HCPCS | Performed by: INTERNAL MEDICINE

## 2022-01-14 PROCEDURE — G8753 SYS BP > OR = 140: HCPCS | Performed by: INTERNAL MEDICINE

## 2022-01-14 PROCEDURE — 99214 OFFICE O/P EST MOD 30 MIN: CPT | Performed by: INTERNAL MEDICINE

## 2022-01-14 RX ORDER — AMLODIPINE BESYLATE 10 MG/1
TABLET ORAL
Qty: 90 TABLET | Refills: 3 | Status: SHIPPED | OUTPATIENT
Start: 2022-01-14

## 2022-01-14 RX ORDER — ATORVASTATIN CALCIUM 40 MG/1
40 TABLET, FILM COATED ORAL
Qty: 90 TABLET | Refills: 3 | Status: SHIPPED | OUTPATIENT
Start: 2022-01-14

## 2022-01-14 RX ORDER — LINAGLIPTIN 5 MG/1
TABLET, FILM COATED ORAL
Qty: 90 TABLET | Refills: 3 | Status: SHIPPED | OUTPATIENT
Start: 2022-01-14 | End: 2022-07-14 | Stop reason: ALTCHOICE

## 2022-01-14 RX ORDER — CARVEDILOL 25 MG/1
TABLET ORAL
Qty: 180 TABLET | Refills: 3 | Status: SHIPPED | OUTPATIENT
Start: 2022-01-14

## 2022-01-14 RX ORDER — METFORMIN HYDROCHLORIDE 500 MG/1
TABLET ORAL
Qty: 180 TABLET | Refills: 3 | Status: SHIPPED | OUTPATIENT
Start: 2022-01-14

## 2022-01-14 NOTE — PATIENT INSTRUCTIONS
SPECIFIC INSTRUCTIONS BELOW         DRINK water   Do not skip meals  Do not eat in between meals    Reduce carbs- pasta, rice, potatoes, bread   Try to avoid processed bread products like BISCUITS, CROISSANTS, MUFFINS    Do not drink juices or sodas, even if they are calorie zero or diet drinks and especially avoid using powders like crystalloids , CATIE-AIDS     Do not eat peanut butter     Do not eat sugar free cookies and cakes   Do not eat peaches, oranges, pineapples, raisins, grapes , canteloupe , honey dew, mangoes , watermelon  and fruit medleys      ----------------------------------------------------------------------------------------------------------      lipitor  40 mg at night       tradjenta 5 mg a day     Metformin 500 mg    Plain,   twice a day with meals     norvasc 10 mg a day      coreg 25 mg  One pill  twice a day           -------------PAY ATTENTION TO THESE GENERAL INSTRUCTIONS -----------------      - The medications prescribed at this visit will not be available at pharmacy until 6 pm       - YOUR MED LIST IS NOT UP TO DATE AS SOME CHANGES ARE BEING MADE AFTER THE VISIT - FOLLOW SPECIFIC INSTRUCTIONS  ABOVE     -ANY tests other than blood work, which you opt to do  outside the  [facility] imaging facilities, you are responsible for prior authorizations if  required    - 18 Nereyda Garsia UP TO DATE ON YOUR AVS- PLEASE IGNORE     Results     *Normal results will not be notified by a phone call starting January 1 2021   *If you have an upcoming visit, the results will be discussed at the visit   *Please sign up for MY CHART if you want access to your lab and test results  *Abnormal results which require immediate attention will be notified by phone call   *Abnormal results which do not require immediate assistance will be notified in 1-2 weeks       Refills    -    have your pharmacy send us a refill request . Refills are done max for one year and a visit is a must before refills are extended    Follow up appointments -  highly encourage you to make it when you are checking out. We can accommodate you into the schedule based on your clinical situation, but not for extending refills beyond a year. Labs are important to give refills and is important to get labs before the visit     Phone calls  -  Allow  24 hrs.  for non-urgent calls to be returned  Prior authorization - It may take 2-4 weeks to process  Forms  -  FMLA, DMV etc., will take up to 2 weeks to process  Cancellations - please notify the office 2 days in advance   Samples  - will only be dispensed at visits       If not showing for the appointments and cancelling appointments within 24 hours are kept track of and three  of such situations in  two consecutive years will likely be considered for termination from the practice    -------------------------------------------------------------------------------------------------------------------

## 2022-01-14 NOTE — PROGRESS NOTES
HISTORY OF PRESENT ILLNESS    Golden Sharif is a 73   y.o. female. HPI,   Patient here for f/u after last  visit for  Type 2 diabetes mellitus  From July 2021       Gained 12  Lbs , not compliant with diet   Log is good       July 2021     No complaints       Jan 2021     Lost 12 lbs   Doing TLC   Her sister is developing memory issues         Old history     She got hospitalized with severe allergic reaction,  And got released  y- day   From the hospital  She had to change to [employer] and cartia from tradjenta and coreg cr  3 weeks ago   She developed skin rash   A week ago          Old history : .   Current A1C is 6.7 % today, compared to [facility]- over 10 %   Current diabetic medications include janumet and amaryl 2mg a day. accompanied by her sister, who is a cook   Current monitoring regimen: home blood tests - 2-3 times daily     Review of Systems   Constitutional: Negative. Psychiatric/Behavioral: Negative for depression and memory loss. The patient does not have insomnia. Physical Exam   Constitutional: oriented to person, place, and time. appears well-developed and well-nourished. Psychiatric: He has a normal mood and affect.          Lab Results   Component Value Date/Time    Hemoglobin A1c 6.4 (H) 01/06/2022 11:22 AM    Hemoglobin A1c 6.4 (H) 07/07/2021 12:00 AM    Hemoglobin A1c 5.9 (H) 01/06/2021 10:13 AM    Glucose 125 (H) 01/06/2022 11:22 AM    Glucose  05/26/2015 11:40 AM    Microalbumin/Creat ratio (mg/g creat) 15 01/06/2021 10:13 AM    Microalb/Creat ratio (ug/mg creat.) 41 (H) 01/06/2022 12:00 AM    Microalbumin,urine random 0.91 01/06/2021 10:13 AM    LDL, calculated 113 (H) 01/06/2022 11:22 AM    LDL, calculated 119.8 (H) 01/06/2021 10:13 AM    Creatinine 1.23 (H) 01/06/2022 11:22 AM      Lab Results   Component Value Date/Time    Cholesterol, total 173 01/06/2022 11:22 AM    HDL Cholesterol 46 01/06/2022 11:22 AM    LDL, calculated 113 (H) 01/06/2022 11:22 AM    LDL, calculated 119.8 (H) 01/06/2021 10:13 AM    Triglyceride 76 01/06/2022 11:22 AM    CHOL/HDL Ratio 4.2 01/06/2021 10:13 AM     Lab Results   Component Value Date/Time    ALT (SGPT) 13 01/06/2022 11:22 AM    Alk. phosphatase 149 (H) 01/06/2022 11:22 AM    Bilirubin, total 0.2 01/06/2022 11:22 AM    Albumin 4.2 01/06/2022 11:22 AM    Protein, total 7.4 01/06/2022 11:22 AM     Lab Results   Component Value Date/Time    GFR est non-AA 45 (L) 01/06/2022 11:22 AM    GFR est AA 52 (L) 01/06/2022 11:22 AM    Creatinine 1.23 (H) 01/06/2022 11:22 AM    BUN 24 01/06/2022 11:22 AM    Sodium 141 01/06/2022 11:22 AM    Potassium 4.4 01/06/2022 11:22 AM    Chloride 107 (H) 01/06/2022 11:22 AM    CO2 20 01/06/2022 11:22 AM                 ASSESSMENT and PLAN      1. Type 2 DM uncontrolled : A1c is  6.4 %      From  Jan 2022    comapred to  6.4 %      From    July 2021   Compared to   5.9 %   From jan 2021  Compared to    6.5 %  By POC   Compared to  6.3 %      From  May 2019    Compared to    6.3 %     From oct 2018    comapred to   6.4 %        From    April 2018    comapred to   6.3 %     From dec 2017    compared to  6.6 %      From June 2017 Jan 2022   On tradjenta and metformin  suggestign trulicity but pt deferred it           July 2021   Log is good, no labs   On tradjenta and metformin  She has been maintaining  good  glycemic control on current regimen        2. Hypoglycemia : should not occur on current regimen    3. HTN : well controlled, on norvasc and coreg  BID   She turned out to be allergic to CARTIA   Acei/arb INCREASED HER CREAT ( she does have vascular disease )  CT abd showed Atherosclerotic disease         4.  Dyslipidemia : lipids  Are off   on lipitor 40 mg hs       5. use of aspirin to prevent MI and TIA's discussed     6.,h/o right foot diabetic ulcer, fourth toe -- resolved ,  And that was when she found out to be diabetic - October 2012   PAD test result   From [facility]    ( podiatrist : Dr. Loree Kulkarni )      7. 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 OBESITY  : Body mass index is 43.12 kg/m².   INSISTED ON LOSING WEIGHT 10 LB BY NEXT VISIT  atleast           Labs bnv in 6 months   Patient voiced understanding her plan of care

## 2022-01-14 NOTE — LETTER
1/14/2022    Patient: Anselmo Garcia   YOB: 1950   Date of Visit: 1/14/2022     Michelle Clinton MD  [address] [address]  Via Fax: [phone]    Dear Michelle Clinton MD,      Thank you for referring Ms. Michael Martin to [address] [place] [address] for evaluation. My notes for this consultation are attached. If you have questions, please do not hesitate to call me. I look forward to following your patient along with you.       Sincerely,    Marcos Carlton MD

## 2022-03-18 PROBLEM — E66.01 OBESITY, MORBID (HCC): Status: ACTIVE | Noted: 2017-12-11

## 2022-07-08 LAB
ALBUMIN SERPL-MCNC: 4.3 G/DL (ref 3.8–4.8)
ALBUMIN/CREAT UR: 25 MG/G CREAT (ref 0–29)
ALBUMIN/GLOB SERPL: 1.3 {RATIO} (ref 1.2–2.2)
ALP SERPL-CCNC: 157 IU/L (ref 44–121)
ALT SERPL-CCNC: 13 IU/L (ref 0–32)
AST SERPL-CCNC: 20 IU/L (ref 0–40)
BASOPHILS # BLD AUTO: 0 X10E3/UL (ref 0–0.2)
BASOPHILS NFR BLD AUTO: 0 %
BILIRUB SERPL-MCNC: 0.2 MG/DL (ref 0–1.2)
BUN SERPL-MCNC: 23 MG/DL (ref 8–27)
BUN/CREAT SERPL: 19 (ref 12–28)
CALCIUM SERPL-MCNC: 9.3 MG/DL (ref 8.7–10.3)
CHLORIDE SERPL-SCNC: 107 MMOL/L (ref 96–106)
CHOLEST SERPL-MCNC: 157 MG/DL (ref 100–199)
CO2 SERPL-SCNC: 19 MMOL/L (ref 20–29)
CREAT SERPL-MCNC: 1.23 MG/DL (ref 0.57–1)
CREAT UR-MCNC: 96.1 MG/DL
EGFR: 47 ML/MIN/1.73
EOSINOPHIL # BLD AUTO: 0.4 X10E3/UL (ref 0–0.4)
EOSINOPHIL NFR BLD AUTO: 4 %
ERYTHROCYTE [DISTWIDTH] IN BLOOD BY AUTOMATED COUNT: 13.4 % (ref 11.7–15.4)
EST. AVERAGE GLUCOSE BLD GHB EST-MCNC: 143 MG/DL
GLOBULIN SER CALC-MCNC: 3.3 G/DL (ref 1.5–4.5)
GLUCOSE SERPL-MCNC: 127 MG/DL (ref 65–99)
HBA1C MFR BLD: 6.6 % (ref 4.8–5.6)
HCT VFR BLD AUTO: 38.8 % (ref 34–46.6)
HDLC SERPL-MCNC: 40 MG/DL
HGB BLD-MCNC: 12.6 G/DL (ref 11.1–15.9)
IMM GRANULOCYTES # BLD AUTO: 0 X10E3/UL (ref 0–0.1)
IMM GRANULOCYTES NFR BLD AUTO: 0 %
IMP & REVIEW OF LAB RESULTS: NORMAL
INTERPRETATION: NORMAL
LDLC SERPL CALC-MCNC: 101 MG/DL (ref 0–99)
LYMPHOCYTES # BLD AUTO: 1.5 X10E3/UL (ref 0.7–3.1)
LYMPHOCYTES NFR BLD AUTO: 16 %
MCH RBC QN AUTO: 29.2 PG (ref 26.6–33)
MCHC RBC AUTO-ENTMCNC: 32.5 G/DL (ref 31.5–35.7)
MCV RBC AUTO: 90 FL (ref 79–97)
MICROALBUMIN UR-MCNC: 23.6 UG/ML
MONOCYTES # BLD AUTO: 0.9 X10E3/UL (ref 0.1–0.9)
MONOCYTES NFR BLD AUTO: 9 %
NEUTROPHILS # BLD AUTO: 6.6 X10E3/UL (ref 1.4–7)
NEUTROPHILS NFR BLD AUTO: 71 %
PLATELET # BLD AUTO: 308 X10E3/UL (ref 150–450)
POTASSIUM SERPL-SCNC: 4.3 MMOL/L (ref 3.5–5.2)
PROT SERPL-MCNC: 7.6 G/DL (ref 6–8.5)
RBC # BLD AUTO: 4.31 X10E6/UL (ref 3.77–5.28)
SODIUM SERPL-SCNC: 142 MMOL/L (ref 134–144)
TRIGL SERPL-MCNC: 81 MG/DL (ref 0–149)
VLDLC SERPL CALC-MCNC: 16 MG/DL (ref 5–40)
WBC # BLD AUTO: 9.4 X10E3/UL (ref 3.4–10.8)

## 2022-07-14 ENCOUNTER — OFFICE VISIT (OUTPATIENT)
Dept: ENDOCRINOLOGY | Age: 72
End: 2022-07-14
Payer: MEDICARE

## 2022-07-14 VITALS
BODY MASS INDEX: 43.2 KG/M2 | HEIGHT: 61 IN | OXYGEN SATURATION: 94 % | HEART RATE: 78 BPM | SYSTOLIC BLOOD PRESSURE: 143 MMHG | DIASTOLIC BLOOD PRESSURE: 68 MMHG | TEMPERATURE: 97.6 F | WEIGHT: 228.8 LBS

## 2022-07-14 DIAGNOSIS — I10 ESSENTIAL HYPERTENSION: ICD-10-CM

## 2022-07-14 DIAGNOSIS — E11.65 TYPE 2 DIABETES MELLITUS WITH HYPERGLYCEMIA, WITHOUT LONG-TERM CURRENT USE OF INSULIN (HCC): Primary | ICD-10-CM

## 2022-07-14 DIAGNOSIS — E78.2 MIXED HYPERLIPIDEMIA: ICD-10-CM

## 2022-07-14 PROCEDURE — G8510 SCR DEP NEG, NO PLAN REQD: HCPCS | Performed by: INTERNAL MEDICINE

## 2022-07-14 PROCEDURE — G8417 CALC BMI ABV UP PARAM F/U: HCPCS | Performed by: INTERNAL MEDICINE

## 2022-07-14 PROCEDURE — 2022F DILAT RTA XM EVC RTNOPTHY: CPT | Performed by: INTERNAL MEDICINE

## 2022-07-14 PROCEDURE — G8753 SYS BP > OR = 140: HCPCS | Performed by: INTERNAL MEDICINE

## 2022-07-14 PROCEDURE — G8754 DIAS BP LESS 90: HCPCS | Performed by: INTERNAL MEDICINE

## 2022-07-14 PROCEDURE — 1123F ACP DISCUSS/DSCN MKR DOCD: CPT | Performed by: INTERNAL MEDICINE

## 2022-07-14 PROCEDURE — G8400 PT W/DXA NO RESULTS DOC: HCPCS | Performed by: INTERNAL MEDICINE

## 2022-07-14 PROCEDURE — 1090F PRES/ABSN URINE INCON ASSESS: CPT | Performed by: INTERNAL MEDICINE

## 2022-07-14 PROCEDURE — G8427 DOCREV CUR MEDS BY ELIG CLIN: HCPCS | Performed by: INTERNAL MEDICINE

## 2022-07-14 PROCEDURE — G8536 NO DOC ELDER MAL SCRN: HCPCS | Performed by: INTERNAL MEDICINE

## 2022-07-14 PROCEDURE — 99214 OFFICE O/P EST MOD 30 MIN: CPT | Performed by: INTERNAL MEDICINE

## 2022-07-14 PROCEDURE — 1101F PT FALLS ASSESS-DOCD LE1/YR: CPT | Performed by: INTERNAL MEDICINE

## 2022-07-14 PROCEDURE — 3017F COLORECTAL CA SCREEN DOC REV: CPT | Performed by: INTERNAL MEDICINE

## 2022-07-14 PROCEDURE — 3044F HG A1C LEVEL LT 7.0%: CPT | Performed by: INTERNAL MEDICINE

## 2022-07-14 RX ORDER — DULAGLUTIDE 0.75 MG/.5ML
INJECTION, SOLUTION SUBCUTANEOUS
Qty: 6 ML | Refills: 1 | Status: SHIPPED | OUTPATIENT
Start: 2022-07-14

## 2022-07-14 NOTE — LETTER
7/14/2022    Patient: Burton Fragoso   YOB: 1950   Date of Visit: 7/14/2022     Jessica Booth MD  [address] [address]  Via Fax: [phone]    Dear Jessica Booth MD,      Thank you for referring Ms. Michele Stewart to [address] [place] [address] for evaluation. My notes for this consultation are attached. If you have questions, please do not hesitate to call me. I look forward to following your patient along with you.       Sincerely,    Adriana Bray MD

## 2022-07-14 NOTE — PATIENT INSTRUCTIONS
SPECIFIC INSTRUCTIONS BELOW         DRINK water   Do not skip meals  Do not eat in between meals    Reduce carbs- pasta, rice, potatoes, bread   Try to avoid processed bread products like BISCUITS, CROISSANTS, MUFFINS    Do not drink juices or sodas, even if they are calorie zero or diet drinks and especially avoid using powders like crystalloids , CATIE-AIDS     Do not eat peanut butter     Do not eat sugar free cookies and cakes   Do not eat peaches, oranges, pineapples, raisins, grapes , canteloupe , honey dew, mangoes , watermelon  and fruit medleys      ----------------------------------------------------------------------------------------------------------      lipitor  40 mg at night       Finish tradjenta 5 mg a day and start on trulicity 8.01 mg a week     Metformin 500 mg    Plain,   twice a day with meals     norvasc 10 mg a day      coreg 25 mg  One pill  twice a day             -------------PAY ATTENTION TO THESE GENERAL INSTRUCTIONS -----------------      - The medications prescribed at this visit will not be available at pharmacy until 6 pm       - YOUR MED LIST IS NOT UP TO DATE AS SOME CHANGES ARE BEING MADE AFTER THE VISIT - FOLLOW SPECIFIC INSTRUCTIONS  ABOVE     -ANY tests other than blood work, which you opt to do  outside the  [facility] imaging facilities, you are responsible for prior authorizations if  required    - 18 Nereyda Garsia UP TO DATE ON YOUR AVS- PLEASE IGNORE     Results     *Normal results will not be notified by a phone call starting January 1 2021   *If you have an upcoming visit, the results will be discussed at the visit   *Please sign up for MY CHART if you want access to your lab and test results  *Abnormal results which require immediate attention will be notified by phone call   *Abnormal results which do not require immediate assistance will be notified in 1-2 weeks       Refills    -    have your pharmacy send us a refill request . Refills are done max for one year and a visit is a must before refills are extended    Follow up appointments -  highly encourage you to make it when you are checking out. We can accommodate you into the schedule based on your clinical situation, but not for extending refills beyond a year. Labs are important to give refills and is important to get labs before the visit     Phone calls  -  Allow  24 hrs.  for non-urgent calls to be returned  Prior authorization - It may take 2-4 weeks to process  Forms  -  FMLA, DMV etc., will take up to 2 weeks to process  Cancellations - please notify the office 2 days in advance   Samples  - will only be dispensed at visits       If not showing for the appointments and cancelling appointments within 24 hours are kept track of and three  of such situations in  two consecutive years will likely be considered for termination from the practice    -------------------------------------------------------------------------------------------------------------------

## 2022-07-14 NOTE — PROGRESS NOTES
HISTORY OF PRESENT ILLNESS    Miguel Fuller is a 81   y.o. female. HPI,   Patient here for f/u after last  visit for  Type 2 diabetes mellitus  From Jan 14 2022     Weight is stable         Jan 2022     Gained 12  Lbs , not compliant with diet   Log is good       Old history     She got hospitalized with severe allergic reaction,  And got released  y- day   From the hospital  She had to change to onglyza and cartia from tradjenta and coreg cr  3 weeks ago   She developed skin rash   A week ago          Old history : .   Current A1C is 6.7 % today, compared to [facility]- over 10 %   Current diabetic medications include janumet and amaryl 2mg a day. accompanied by her sister, who is a cook   Current monitoring regimen: home blood tests - 2-3 times daily     Review of Systems   Constitutional: Negative. Psychiatric/Behavioral: Negative for depression and memory loss. The patient does not have insomnia. Physical Exam   Constitutional: oriented to person, place, and time. appears well-developed and well-nourished. Psychiatric: He has a normal mood and affect.          Lab Results   Component Value Date/Time    Hemoglobin A1c 6.6 (H) 07/07/2022 12:00 AM    Hemoglobin A1c 6.4 (H) 01/06/2022 11:22 AM    Hemoglobin A1c 6.4 (H) 07/07/2021 12:00 AM    Glucose 127 (H) 07/07/2022 12:00 AM    Glucose  05/26/2015 11:40 AM    Microalbumin/Creat ratio (mg/g creat) 15 01/06/2021 10:13 AM    Microalb/Creat ratio (ug/mg creat.) 25 07/07/2022 12:00 AM    Microalbumin,urine random 0.91 01/06/2021 10:13 AM    LDL, calculated 101 (H) 07/07/2022 12:00 AM    LDL, calculated 119.8 (H) 01/06/2021 10:13 AM    Creatinine 1.23 (H) 07/07/2022 12:00 AM      Lab Results   Component Value Date/Time    Cholesterol, total 157 07/07/2022 12:00 AM    HDL Cholesterol 40 07/07/2022 12:00 AM    LDL, calculated 101 (H) 07/07/2022 12:00 AM    LDL, calculated 119.8 (H) 01/06/2021 10:13 AM    Triglyceride 81 07/07/2022 12:00 AM    CHOL/HDL Ratio 4.2 01/06/2021 10:13 AM     Lab Results   Component Value Date/Time    ALT (SGPT) 13 07/07/2022 12:00 AM    Alk. phosphatase 157 (H) 07/07/2022 12:00 AM    Bilirubin, total 0.2 07/07/2022 12:00 AM    Albumin 4.3 07/07/2022 12:00 AM    Protein, total 7.6 07/07/2022 12:00 AM    PLATELET 993 92/25/2136 12:00 AM     Lab Results   Component Value Date/Time    GFR est non-AA 45 (L) 01/06/2022 11:22 AM    GFR est AA 52 (L) 01/06/2022 11:22 AM    Creatinine 1.23 (H) 07/07/2022 12:00 AM    BUN 23 07/07/2022 12:00 AM    Sodium 142 07/07/2022 12:00 AM    Potassium 4.3 07/07/2022 12:00 AM    Chloride 107 (H) 07/07/2022 12:00 AM    CO2 19 (L) 07/07/2022 12:00 AM           ASSESSMENT and PLAN      1. Type 2 DM uncontrolled : A1c is 6.6 %    From  July 2022   Compared to   6.4 %      From  Jan 2022    comapred to  6.4 %      From    July 2021   Compared to   5.9 %   From jan 2021  Compared to    6.5 %  By POC   Compared to  6.3 %      From  May 2019    Compared to    6.3 %     From oct 2018    comapred to   6.4 %        From    April 2018 July 2022     On  Metformin  Suggesting change to trulicity from tradjenta           Jan 2022   On tradjenta and metformin  suggestign trulicity but pt deferred it         July 2021   Log is good, no labs   On tradjenta and metformin  She has been maintaining  good  glycemic control on current regimen        2. Hypoglycemia : should not occur on current regimen    3. HTN : well controlled, on norvasc and coreg  BID   She turned out to be allergic to CARTIA   Acei/arb INCREASED HER CREAT ( she does have vascular disease )  CT abd showed Atherosclerotic disease         4.  Dyslipidemia : lipids  Are off   on lipitor 40 mg hs       5. use of aspirin to prevent MI and TIA's discussed     6.,h/o right foot diabetic ulcer, fourth toe -- resolved ,  And that was when she found out to be diabetic - October 2012   PAD test result   From [facility]    ( podiatrist :  [address] [facility] )      7. 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 OBESITY  : Body mass index is 43.23 kg/m².   INSISTED ON LOSING WEIGHT 10 LB BY NEXT VISIT  atleast           Labs bnv in 6 months   Patient voiced understanding her plan of care

## 2023-01-19 ENCOUNTER — OFFICE VISIT (OUTPATIENT)
Dept: ENDOCRINOLOGY | Age: 73
End: 2023-01-19
Payer: MEDICARE

## 2023-01-19 VITALS
WEIGHT: 227.38 LBS | HEART RATE: 87 BPM | DIASTOLIC BLOOD PRESSURE: 67 MMHG | HEIGHT: 61 IN | BODY MASS INDEX: 42.93 KG/M2 | SYSTOLIC BLOOD PRESSURE: 146 MMHG | TEMPERATURE: 97 F | OXYGEN SATURATION: 97 %

## 2023-01-19 DIAGNOSIS — E78.2 MIXED HYPERLIPIDEMIA: ICD-10-CM

## 2023-01-19 DIAGNOSIS — I10 ESSENTIAL HYPERTENSION: ICD-10-CM

## 2023-01-19 DIAGNOSIS — E11.65 TYPE 2 DIABETES MELLITUS WITH HYPERGLYCEMIA, WITHOUT LONG-TERM CURRENT USE OF INSULIN (HCC): Primary | ICD-10-CM

## 2023-01-19 LAB — HBA1C MFR BLD HPLC: 6.6 %

## 2023-01-19 PROCEDURE — G8400 PT W/DXA NO RESULTS DOC: HCPCS | Performed by: INTERNAL MEDICINE

## 2023-01-19 PROCEDURE — 3044F HG A1C LEVEL LT 7.0%: CPT | Performed by: INTERNAL MEDICINE

## 2023-01-19 PROCEDURE — 83036 HEMOGLOBIN GLYCOSYLATED A1C: CPT | Performed by: INTERNAL MEDICINE

## 2023-01-19 PROCEDURE — G8427 DOCREV CUR MEDS BY ELIG CLIN: HCPCS | Performed by: INTERNAL MEDICINE

## 2023-01-19 PROCEDURE — G8510 SCR DEP NEG, NO PLAN REQD: HCPCS | Performed by: INTERNAL MEDICINE

## 2023-01-19 PROCEDURE — G8536 NO DOC ELDER MAL SCRN: HCPCS | Performed by: INTERNAL MEDICINE

## 2023-01-19 PROCEDURE — 99214 OFFICE O/P EST MOD 30 MIN: CPT | Performed by: INTERNAL MEDICINE

## 2023-01-19 PROCEDURE — 3077F SYST BP >= 140 MM HG: CPT | Performed by: INTERNAL MEDICINE

## 2023-01-19 PROCEDURE — 2022F DILAT RTA XM EVC RTNOPTHY: CPT | Performed by: INTERNAL MEDICINE

## 2023-01-19 PROCEDURE — 3078F DIAST BP <80 MM HG: CPT | Performed by: INTERNAL MEDICINE

## 2023-01-19 PROCEDURE — 1090F PRES/ABSN URINE INCON ASSESS: CPT | Performed by: INTERNAL MEDICINE

## 2023-01-19 PROCEDURE — G8417 CALC BMI ABV UP PARAM F/U: HCPCS | Performed by: INTERNAL MEDICINE

## 2023-01-19 PROCEDURE — 1101F PT FALLS ASSESS-DOCD LE1/YR: CPT | Performed by: INTERNAL MEDICINE

## 2023-01-19 PROCEDURE — 1123F ACP DISCUSS/DSCN MKR DOCD: CPT | Performed by: INTERNAL MEDICINE

## 2023-01-19 PROCEDURE — 3017F COLORECTAL CA SCREEN DOC REV: CPT | Performed by: INTERNAL MEDICINE

## 2023-01-19 RX ORDER — DULAGLUTIDE 0.75 MG/.5ML
0.75 INJECTION, SOLUTION SUBCUTANEOUS
Qty: 2 ML | Refills: 11 | Status: SHIPPED | OUTPATIENT
Start: 2023-01-19

## 2023-01-19 RX ORDER — CARVEDILOL 25 MG/1
TABLET ORAL
Qty: 180 TABLET | Refills: 3 | Status: SHIPPED | OUTPATIENT
Start: 2023-01-19

## 2023-01-19 RX ORDER — AMLODIPINE BESYLATE 10 MG/1
TABLET ORAL
Qty: 90 TABLET | Refills: 3 | Status: SHIPPED | OUTPATIENT
Start: 2023-01-19

## 2023-01-19 RX ORDER — ATORVASTATIN CALCIUM 40 MG/1
40 TABLET, FILM COATED ORAL
Qty: 90 TABLET | Refills: 3 | Status: SHIPPED | OUTPATIENT
Start: 2023-01-19

## 2023-01-19 RX ORDER — METFORMIN HYDROCHLORIDE 500 MG/1
TABLET ORAL
Qty: 180 TABLET | Refills: 3 | Status: SHIPPED | OUTPATIENT
Start: 2023-01-19

## 2023-01-19 NOTE — PROGRESS NOTES
HISTORY OF PRESENT ILLNESS    Fátima Bojorquez is a 81   y.o. female. HPI,   Patient here for f/u after last  visit for  Type 2 diabetes mellitus  From July 2022     Lost 1 lb   Tolerating trulicity   - started it only 2.5 months ago     July 2022     Weight is stable       Jan 2022     Gained 12  Lbs , not compliant with diet   Log is good       Old history     She got hospitalized with severe allergic reaction,  And got released  y- day   From the hospital  She had to change to onglyza and cartia from tradjenta and coreg cr  3 weeks ago   She developed skin rash   A week ago          Old history : .   Current A1C is 6.7 % today, compared to [facility]- over 10 %   Current diabetic medications include janumet and amaryl 2mg a day. accompanied by her sister, who is a cook   Current monitoring regimen: home blood tests - 2-3 times daily     Review of Systems   Constitutional: Negative. Psychiatric/Behavioral: Negative for depression and memory loss. The patient does not have insomnia. Physical Exam   Constitutional: oriented to person, place, and time. appears well-developed and well-nourished. Psychiatric: He has a normal mood and affect. She missed labs this time       ASSESSMENT and PLAN      1. Type 2 DM uncontrolled : A1c is   ? Compared to   6.6 %    From  July 2022   Compared to   6.4 %      From  Jan 2022    comapred to  6.4 %      From    July 2021   Compared to   5.9 %   From jan 2021  Compared to    6.5 %  By POC   Compared to  6.3 %      From  May 2019    Compared to    6.3 %     From oct 2018    comapred to   6.4 %        From    April 2018 Jan 2023     Stay on metformin   and   Trulicity   She opted to stay on low dose   Encouraging her to do diet more       July 2022     On  Metformin  Suggesting change to trulicity from tradjenta       Jan 2022   On tradjenta and metformin  suggestign trulicity but pt deferred it           2.  Hypoglycemia : should not occur on current regimen    3. HTN : well controlled, on norvasc and coreg  BID   She turned out to be allergic to CARTIA   Acei/arb INCREASED HER CREAT ( she does have vascular disease )  CT abd showed Atherosclerotic disease         4. Dyslipidemia : lipids  Are off   on lipitor 40 mg hs , await  labs today       5. use of aspirin to prevent MI and TIA's discussed     6.,h/o right foot diabetic ulcer, fourth toe -- resolved ,  And that was when she found out to be diabetic - October 2012   PAD test result   From [facility]    ( podiatrist : Dr. Katherine Soria )      7. 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 OBESITY  : Body mass index is 42.96 kg/m².   INSISTED ON LOSING WEIGHT 10 LB BY NEXT VISIT  atleast         Ord labs today     Labs bnv in 6 months   Patient voiced understanding her plan of care

## 2023-01-19 NOTE — LETTER
1/19/2023    Patient: Veronica Dougherty   YOB: 1950   Date of Visit: 1/19/2023     Antolin Ledezma MD  [address] [address]  Via Fax: [phone]    Dear Antolin Ledezma MD,      Thank you for referring Ms. Guerrero Larsen to [address] [place] [address] for evaluation. My notes for this consultation are attached. If you have questions, please do not hesitate to call me. I look forward to following your patient along with you.       Sincerely,    Rose Shen MD

## 2023-01-19 NOTE — PROGRESS NOTES
Toshia Taylor is a 81 y.o. female here for   Chief Complaint   Patient presents with    Diabetes       1. Have you been to the ER, urgent care clinic since your last visit? Hospitalized since your last visit? - no     2. Have you seen or consulted any other health care providers outside of the [address] [place] [address] since your last visit? Include any pap smears or colon screening.- Eye Dr Imani Hdez [facility] .

## 2023-01-19 NOTE — PATIENT INSTRUCTIONS
SPECIFIC INSTRUCTIONS BELOW         DRINK water   Do not skip meals  Do not eat in between meals    Reduce carbs- pasta, rice, potatoes, bread   Try to avoid processed bread products like BISCUITS, CROISSANTS, MUFFINS    Do not drink juices or sodas, even if they are calorie zero or diet drinks and especially avoid using powders like crystalloids , CATIE-AIDS     Do not eat peanut butter     Do not eat sugar free cookies and cakes   Do not eat peaches, oranges, pineapples, raisins, grapes , canteloupe , honey dew, mangoes , watermelon  and fruit medleys      ----------------------------------------------------------------------------------------------------------      lipitor  40 mg at night       Stay on  trulicity 6.18 mg a week     Metformin 500 mg    Plain,   twice a day with meals     norvasc 10 mg a day      coreg 25 mg  One pill  twice a day               -------------PAY ATTENTION TO THESE GENERAL INSTRUCTIONS -----------------      - The medications prescribed at this visit will not be available at pharmacy until 6 pm       - YOUR MED LIST IS NOT UP TO DATE AS SOME CHANGES ARE BEING MADE AFTER THE VISIT - FOLLOW SPECIFIC INSTRUCTIONS  ABOVE     -ANY tests other than blood work, which you opt to do  outside the  [facility] imaging facilities, you are responsible for prior authorizations if  required    - [address] UP TO DATE ON YOUR AVS- PLEASE IGNORE     Results     *Normal results will not be notified by a phone call starting January 1 2021   *If you have an upcoming visit, the results will be discussed at the visit   *Please sign up for MY CHART if you want access to your lab and test results  *Abnormal results which require immediate attention will be notified by phone call   *Abnormal results which do not require immediate assistance will be notified in 1-2 weeks       Refills    -    have your pharmacy send us a refill request . Refills are done max for one year and a visit is a must before refills are extended    Follow up appointments -  highly encourage you to make it when you are checking out. We can accommodate you into the schedule based on your clinical situation, but not for extending refills beyond a year. Labs are important to give refills and is important to get labs before the visit     Phone calls  -  Allow  24 hrs.  for non-urgent calls to be returned  Prior authorization - It may take 2-4 weeks to process  Forms  -  FMLA, DMV etc., will take up to 2 weeks to process  Cancellations - please notify the office 2 days in advance   Samples  - will only be dispensed at visits       If not showing for the appointments and cancelling appointments within 24 hours are kept track of and three  of such situations in  two consecutive years will likely be considered for termination from the practice    -------------------------------------------------------------------------------------------------------------------

## 2023-01-20 LAB
ALBUMIN SERPL-MCNC: 4 G/DL (ref 3.5–5)
ALBUMIN/GLOB SERPL: 0.9 (ref 1.1–2.2)
ALP SERPL-CCNC: 161 U/L (ref 45–117)
ALT SERPL-CCNC: 20 U/L (ref 12–78)
ANION GAP SERPL CALC-SCNC: 5 MMOL/L (ref 5–15)
AST SERPL-CCNC: 14 U/L (ref 15–37)
BILIRUB SERPL-MCNC: 0.3 MG/DL (ref 0.2–1)
BUN SERPL-MCNC: 22 MG/DL (ref 6–20)
BUN/CREAT SERPL: 16 (ref 12–20)
CALCIUM SERPL-MCNC: 9.7 MG/DL (ref 8.5–10.1)
CHLORIDE SERPL-SCNC: 107 MMOL/L (ref 97–108)
CHOLEST SERPL-MCNC: 154 MG/DL
CO2 SERPL-SCNC: 27 MMOL/L (ref 21–32)
CREAT SERPL-MCNC: 1.38 MG/DL (ref 0.55–1.02)
CREAT UR-MCNC: 73.8 MG/DL
GLOBULIN SER CALC-MCNC: 4.3 G/DL (ref 2–4)
GLUCOSE SERPL-MCNC: 112 MG/DL (ref 65–100)
HDLC SERPL-MCNC: 45 MG/DL
HDLC SERPL: 3.4 (ref 0–5)
LDLC SERPL CALC-MCNC: 92.4 MG/DL (ref 0–100)
MICROALBUMIN UR-MCNC: 2.84 MG/DL
MICROALBUMIN/CREAT UR-RTO: 38 MG/G (ref 0–30)
POTASSIUM SERPL-SCNC: 4.5 MMOL/L (ref 3.5–5.1)
PROT SERPL-MCNC: 8.3 G/DL (ref 6.4–8.2)
SODIUM SERPL-SCNC: 139 MMOL/L (ref 136–145)
TRIGL SERPL-MCNC: 83 MG/DL (ref ?–150)
VLDLC SERPL CALC-MCNC: 16.6 MG/DL

## 2023-04-22 DIAGNOSIS — E11.65 TYPE 2 DIABETES MELLITUS WITH HYPERGLYCEMIA, WITHOUT LONG-TERM CURRENT USE OF INSULIN (HCC): Primary | ICD-10-CM

## 2023-04-22 DIAGNOSIS — E78.2 MIXED HYPERLIPIDEMIA: ICD-10-CM

## 2023-04-23 DIAGNOSIS — E11.65 TYPE 2 DIABETES MELLITUS WITH HYPERGLYCEMIA, WITHOUT LONG-TERM CURRENT USE OF INSULIN (HCC): Primary | ICD-10-CM

## 2023-04-23 DIAGNOSIS — E78.2 MIXED HYPERLIPIDEMIA: ICD-10-CM

## 2023-04-24 DIAGNOSIS — E78.2 MIXED HYPERLIPIDEMIA: ICD-10-CM

## 2023-04-24 DIAGNOSIS — E11.65 TYPE 2 DIABETES MELLITUS WITH HYPERGLYCEMIA, WITHOUT LONG-TERM CURRENT USE OF INSULIN (HCC): Primary | ICD-10-CM

## 2023-05-22 RX ORDER — AMLODIPINE BESYLATE 10 MG/1
1 TABLET ORAL DAILY
COMMUNITY
Start: 2023-01-19

## 2023-05-22 RX ORDER — CARVEDILOL 25 MG/1
TABLET ORAL
COMMUNITY
Start: 2023-01-19

## 2023-05-22 RX ORDER — LORATADINE 10 MG/1
10 TABLET ORAL DAILY PRN
COMMUNITY

## 2023-05-22 RX ORDER — DULAGLUTIDE 0.75 MG/.5ML
0.75 INJECTION, SOLUTION SUBCUTANEOUS
COMMUNITY
Start: 2023-01-19

## 2023-05-22 RX ORDER — ATORVASTATIN CALCIUM 40 MG/1
1 TABLET, FILM COATED ORAL NIGHTLY
COMMUNITY
Start: 2023-01-19

## 2023-08-07 ENCOUNTER — NURSE ONLY (OUTPATIENT)
Age: 73
End: 2023-08-07

## 2023-08-07 DIAGNOSIS — E78.2 MIXED HYPERLIPIDEMIA: ICD-10-CM

## 2023-08-07 DIAGNOSIS — E11.65 TYPE 2 DIABETES MELLITUS WITH HYPERGLYCEMIA, WITHOUT LONG-TERM CURRENT USE OF INSULIN (HCC): ICD-10-CM

## 2023-08-08 LAB
ALBUMIN SERPL-MCNC: 3.5 G/DL (ref 3.5–5)
ALBUMIN/GLOB SERPL: 1 (ref 1.1–2.2)
ALP SERPL-CCNC: 147 U/L (ref 45–117)
ALT SERPL-CCNC: 24 U/L (ref 12–78)
ANION GAP SERPL CALC-SCNC: 7 MMOL/L (ref 5–15)
AST SERPL-CCNC: 18 U/L (ref 15–37)
BILIRUB SERPL-MCNC: 0.3 MG/DL (ref 0.2–1)
BUN SERPL-MCNC: 16 MG/DL (ref 6–20)
BUN/CREAT SERPL: 14 (ref 12–20)
CALCIUM SERPL-MCNC: 9.1 MG/DL (ref 8.5–10.1)
CHLORIDE SERPL-SCNC: 107 MMOL/L (ref 97–108)
CHOLEST SERPL-MCNC: 130 MG/DL
CO2 SERPL-SCNC: 24 MMOL/L (ref 21–32)
CREAT SERPL-MCNC: 1.18 MG/DL (ref 0.55–1.02)
CREAT UR-MCNC: 46.8 MG/DL
GLOBULIN SER CALC-MCNC: 3.6 G/DL (ref 2–4)
GLUCOSE SERPL-MCNC: 104 MG/DL (ref 65–100)
HDLC SERPL-MCNC: 39 MG/DL
HDLC SERPL: 3.3 (ref 0–5)
LDLC SERPL CALC-MCNC: 73.6 MG/DL (ref 0–100)
MICROALBUMIN UR-MCNC: 3.91 MG/DL
MICROALBUMIN/CREAT UR-RTO: 84 MG/G (ref 0–30)
POTASSIUM SERPL-SCNC: 3.9 MMOL/L (ref 3.5–5.1)
PROT SERPL-MCNC: 7.1 G/DL (ref 6.4–8.2)
SODIUM SERPL-SCNC: 138 MMOL/L (ref 136–145)
TRIGL SERPL-MCNC: 87 MG/DL
VLDLC SERPL CALC-MCNC: 17.4 MG/DL

## 2023-08-09 LAB
EST. AVERAGE GLUCOSE BLD GHB EST-MCNC: 134 MG/DL
HBA1C MFR BLD: 6.3 % (ref 4–5.6)

## 2023-08-14 ENCOUNTER — OFFICE VISIT (OUTPATIENT)
Age: 73
End: 2023-08-14
Payer: MEDICARE

## 2023-08-14 VITALS
TEMPERATURE: 96.9 F | SYSTOLIC BLOOD PRESSURE: 132 MMHG | WEIGHT: 221.8 LBS | DIASTOLIC BLOOD PRESSURE: 61 MMHG | RESPIRATION RATE: 20 BRPM | HEIGHT: 61 IN | HEART RATE: 92 BPM | OXYGEN SATURATION: 92 % | BODY MASS INDEX: 41.88 KG/M2

## 2023-08-14 DIAGNOSIS — E78.2 MIXED HYPERLIPIDEMIA: ICD-10-CM

## 2023-08-14 DIAGNOSIS — I10 ESSENTIAL (PRIMARY) HYPERTENSION: ICD-10-CM

## 2023-08-14 DIAGNOSIS — E11.65 TYPE 2 DIABETES MELLITUS WITH HYPERGLYCEMIA, WITHOUT LONG-TERM CURRENT USE OF INSULIN (HCC): Primary | ICD-10-CM

## 2023-08-14 PROCEDURE — G8427 DOCREV CUR MEDS BY ELIG CLIN: HCPCS | Performed by: INTERNAL MEDICINE

## 2023-08-14 PROCEDURE — 99214 OFFICE O/P EST MOD 30 MIN: CPT | Performed by: INTERNAL MEDICINE

## 2023-08-14 PROCEDURE — G8417 CALC BMI ABV UP PARAM F/U: HCPCS | Performed by: INTERNAL MEDICINE

## 2023-08-14 PROCEDURE — 3017F COLORECTAL CA SCREEN DOC REV: CPT | Performed by: INTERNAL MEDICINE

## 2023-08-14 PROCEDURE — 2022F DILAT RTA XM EVC RTNOPTHY: CPT | Performed by: INTERNAL MEDICINE

## 2023-08-14 PROCEDURE — 3044F HG A1C LEVEL LT 7.0%: CPT | Performed by: INTERNAL MEDICINE

## 2023-08-14 PROCEDURE — 1123F ACP DISCUSS/DSCN MKR DOCD: CPT | Performed by: INTERNAL MEDICINE

## 2023-08-14 PROCEDURE — 3075F SYST BP GE 130 - 139MM HG: CPT | Performed by: INTERNAL MEDICINE

## 2023-08-14 PROCEDURE — G8400 PT W/DXA NO RESULTS DOC: HCPCS | Performed by: INTERNAL MEDICINE

## 2023-08-14 PROCEDURE — 1036F TOBACCO NON-USER: CPT | Performed by: INTERNAL MEDICINE

## 2023-08-14 PROCEDURE — 1090F PRES/ABSN URINE INCON ASSESS: CPT | Performed by: INTERNAL MEDICINE

## 2023-08-14 PROCEDURE — 3078F DIAST BP <80 MM HG: CPT | Performed by: INTERNAL MEDICINE

## 2023-08-14 RX ORDER — ASPIRIN 81 MG/1
81 TABLET, CHEWABLE ORAL DAILY
COMMUNITY

## 2023-08-14 NOTE — PATIENT INSTRUCTIONS
SPECIFIC INSTRUCTIONS BELOW        DRINK water   Do not skip meals  Do not eat in between meals     Reduce carbs- pasta, rice, potatoes, bread   Try to avoid processed bread products like BISCUITS, CROISSANTS, MUFFINS     Do not drink juices or sodas, even if they are calorie zero or diet drinks and especially avoid using powders like crystalloids , JAELYN-AIDS      Do not eat peanut butter      Do not eat sugar free cookies and cakes   Do not eat peaches, oranges, pineapples, raisins, grapes , canteloupe , honey dew, mangoes , watermelon  and fruit medleys        ----------------------------------------------------------------------------------------------------------        lipitor  40 mg at night         Stay on  trulicity 5.07 mg a week      Metformin 500 mg    Plain,   twice a day with meals      norvasc 10 mg a day       coreg 25 mg  One pill  twice a day               -------------PAY ATTENTION TO THESE GENERAL INSTRUCTIONS -----------------      - The medications prescribed at this visit will not be available at pharmacy until 6 pm       - YOUR MED LIST IS NOT UP TO DATE AS SOME CHANGES ARE BEING MADE AFTER THE VISIT - FOLLOW SPECIFIC INSTRUCTIONS  ABOVE     -ANY tests other than blood work, which you opt to do  outside the  [facility] imaging facilities, you are responsible for prior authorizations if  required    - [id number] UP TO DATE ON YOUR AVS- PLEASE IGNORE     Results     *Normal results will not be notified by a phone call starting January 1 2021   *If you have an upcoming visit, the results will be discussed at the visit   *Please sign up for MY CHART if you want access to your lab and test results  *Abnormal results which require immediate attention will be notified by phone call   *Abnormal results which do not require immediate assistance will be notified in 1-2 weeks       Refills    -    have your pharmacy send us a refill request . Refills are done max for one year

## 2024-02-07 ENCOUNTER — NURSE ONLY (OUTPATIENT)
Age: 74
End: 2024-02-07

## 2024-02-07 DIAGNOSIS — I10 ESSENTIAL (PRIMARY) HYPERTENSION: ICD-10-CM

## 2024-02-07 DIAGNOSIS — E78.2 MIXED HYPERLIPIDEMIA: ICD-10-CM

## 2024-02-07 DIAGNOSIS — E11.65 TYPE 2 DIABETES MELLITUS WITH HYPERGLYCEMIA, WITHOUT LONG-TERM CURRENT USE OF INSULIN (HCC): ICD-10-CM

## 2024-02-07 LAB
ALBUMIN SERPL-MCNC: 3.7 G/DL (ref 3.5–5)
ALBUMIN/GLOB SERPL: 0.9 (ref 1.1–2.2)
ALP SERPL-CCNC: 163 U/L (ref 45–117)
ALT SERPL-CCNC: 27 U/L (ref 12–78)
ANION GAP SERPL CALC-SCNC: 3 MMOL/L (ref 5–15)
AST SERPL-CCNC: 16 U/L (ref 15–37)
BILIRUB SERPL-MCNC: 0.3 MG/DL (ref 0.2–1)
BUN SERPL-MCNC: 25 MG/DL (ref 6–20)
BUN/CREAT SERPL: 20 (ref 12–20)
CALCIUM SERPL-MCNC: 9.3 MG/DL (ref 8.5–10.1)
CHLORIDE SERPL-SCNC: 110 MMOL/L (ref 97–108)
CHOLEST SERPL-MCNC: 153 MG/DL
CO2 SERPL-SCNC: 26 MMOL/L (ref 21–32)
CREAT SERPL-MCNC: 1.22 MG/DL (ref 0.55–1.02)
CREAT UR-MCNC: 175 MG/DL
EST. AVERAGE GLUCOSE BLD GHB EST-MCNC: 151 MG/DL
GLOBULIN SER CALC-MCNC: 4 G/DL (ref 2–4)
GLUCOSE SERPL-MCNC: 156 MG/DL (ref 65–100)
HBA1C MFR BLD: 6.9 % (ref 4–5.6)
HDLC SERPL-MCNC: 44 MG/DL
HDLC SERPL: 3.5 (ref 0–5)
LDLC SERPL CALC-MCNC: 95 MG/DL (ref 0–100)
MICROALBUMIN UR-MCNC: 5.22 MG/DL
MICROALBUMIN/CREAT UR-RTO: 30 MG/G (ref 0–30)
POTASSIUM SERPL-SCNC: 4.3 MMOL/L (ref 3.5–5.1)
PROT SERPL-MCNC: 7.7 G/DL (ref 6.4–8.2)
SODIUM SERPL-SCNC: 139 MMOL/L (ref 136–145)
TRIGL SERPL-MCNC: 70 MG/DL
VLDLC SERPL CALC-MCNC: 14 MG/DL

## 2024-02-14 ENCOUNTER — OFFICE VISIT (OUTPATIENT)
Age: 74
End: 2024-02-14
Payer: MEDICARE

## 2024-02-14 VITALS
WEIGHT: 225.2 LBS | SYSTOLIC BLOOD PRESSURE: 148 MMHG | HEIGHT: 61 IN | DIASTOLIC BLOOD PRESSURE: 82 MMHG | HEART RATE: 89 BPM | BODY MASS INDEX: 42.52 KG/M2 | OXYGEN SATURATION: 96 % | TEMPERATURE: 97.9 F | RESPIRATION RATE: 18 BRPM

## 2024-02-14 DIAGNOSIS — I10 ESSENTIAL (PRIMARY) HYPERTENSION: ICD-10-CM

## 2024-02-14 DIAGNOSIS — E11.65 TYPE 2 DIABETES MELLITUS WITH HYPERGLYCEMIA, WITHOUT LONG-TERM CURRENT USE OF INSULIN (HCC): Primary | ICD-10-CM

## 2024-02-14 DIAGNOSIS — I73.9 PAD (PERIPHERAL ARTERY DISEASE) (HCC): ICD-10-CM

## 2024-02-14 DIAGNOSIS — E78.2 MIXED HYPERLIPIDEMIA: ICD-10-CM

## 2024-02-14 PROCEDURE — G8484 FLU IMMUNIZE NO ADMIN: HCPCS | Performed by: INTERNAL MEDICINE

## 2024-02-14 PROCEDURE — 3077F SYST BP >= 140 MM HG: CPT | Performed by: INTERNAL MEDICINE

## 2024-02-14 PROCEDURE — 3044F HG A1C LEVEL LT 7.0%: CPT | Performed by: INTERNAL MEDICINE

## 2024-02-14 PROCEDURE — 1036F TOBACCO NON-USER: CPT | Performed by: INTERNAL MEDICINE

## 2024-02-14 PROCEDURE — G8400 PT W/DXA NO RESULTS DOC: HCPCS | Performed by: INTERNAL MEDICINE

## 2024-02-14 PROCEDURE — G8427 DOCREV CUR MEDS BY ELIG CLIN: HCPCS | Performed by: INTERNAL MEDICINE

## 2024-02-14 PROCEDURE — G8417 CALC BMI ABV UP PARAM F/U: HCPCS | Performed by: INTERNAL MEDICINE

## 2024-02-14 PROCEDURE — 99214 OFFICE O/P EST MOD 30 MIN: CPT | Performed by: INTERNAL MEDICINE

## 2024-02-14 PROCEDURE — 3079F DIAST BP 80-89 MM HG: CPT | Performed by: INTERNAL MEDICINE

## 2024-02-14 PROCEDURE — 2022F DILAT RTA XM EVC RTNOPTHY: CPT | Performed by: INTERNAL MEDICINE

## 2024-02-14 PROCEDURE — 1090F PRES/ABSN URINE INCON ASSESS: CPT | Performed by: INTERNAL MEDICINE

## 2024-02-14 PROCEDURE — 1123F ACP DISCUSS/DSCN MKR DOCD: CPT | Performed by: INTERNAL MEDICINE

## 2024-02-14 PROCEDURE — 3017F COLORECTAL CA SCREEN DOC REV: CPT | Performed by: INTERNAL MEDICINE

## 2024-02-14 RX ORDER — DULAGLUTIDE 1.5 MG/.5ML
INJECTION, SOLUTION SUBCUTANEOUS
Qty: 6 ML | Refills: 3 | Status: SHIPPED | OUTPATIENT
Start: 2024-02-14

## 2024-02-14 NOTE — PATIENT INSTRUCTIONS
year and a visit is a must before refills are extended    Follow up appointments -  highly encourage you to make it when you are checking out. We can accommodate you into the schedule based on your clinical situation, but not for extending refills beyond a year. Labs are important to give refills and is important to get labs before the visit     Phone calls  -  Allow  24 hrs. for non-urgent calls to be returned  Prior authorization - It may take 2-4 weeks to process  Forms  -  FMLA, DMV etc., will take up to 2 weeks to process  Cancellations - please notify the office 2 days in advance   Samples  - will only be dispensed at visits       If not showing for the appointments and cancelling appointments within 24 hours are kept track of and three  of such situations in  two consecutive years will likely be considered for termination from the practice    -------------------------------------------------------------------------------------------------------------------

## 2024-02-14 NOTE — PROGRESS NOTES
Chief Complaint   Patient presents with    Follow-up     6 month        BP (!) 148/82 (Site: Left Upper Arm)   Pulse 89   Temp 97.9 °F (36.6 °C)   Resp 18   Ht 1.549 m (5' 1\")   Wt 102.2 kg (225 lb 3.2 oz)   SpO2 96%   BMI 42.55 kg/m²      1. Have you been to the ER, urgent care clinic since your last visit?  Hospitalized since your last visit? no    2. Have you seen or consulted any other health care providers outside of the [facility] since your last visit?  Include any pap smears or colon screening.  no    Health Maintenance Due   Topic Date Due    COVID-19 Vaccine (1) Never done    Pneumococcal 65+ years Vaccine (1 - PCV) Never done    Hepatitis C screen  Never done    DTaP/Tdap/Td vaccine (1 - Tdap) Never done    Colorectal Cancer Screen  Never done    Breast cancer screen  Never done    Shingles vaccine (1 of 2) Never done    DEXA (modify frequency per FRAX score)  Never done    Respiratory Syncytial Virus (RSV) Pregnant or age 60 yrs+ (1 - 1-dose 60+ series) Never done    Flu vaccine (1) Never done    Annual Wellness Visit (Medicare)  Never done    Diabetic retinal exam  12/19/2023    Depression Screen  01/19/2024             No data to display                                
   GLUCOSE 156 (H) 02/07/2024    CALCIUM 9.3 02/07/2024    PROT 7.7 02/07/2024    LABALBU 3.7 02/07/2024    BILITOT 0.3 02/07/2024    ALKPHOS 163 (H) 02/07/2024    AST 16 02/07/2024    ALT 27 02/07/2024    LABGLOM 47 (L) 02/07/2024    GFRAA 52 (L) 01/06/2022    AGRATIO 0.9 (L) 02/07/2024    GLOB 4.0 02/07/2024    CHOL 153 02/07/2024    TRIG 70 02/07/2024    LDLCALC 95 02/07/2024    HDL 44 02/07/2024       Lab Results   Component Value Date    MALBCR 30 02/07/2024               ASSESSMENT and PLAN      1.  Type 2 DM uncontrolled : A1c is 6.9 %    from   feb 2024   compared to   6.3 %   from  august 2023        Compared to   6.6 %    From  July 2022   Compared to   6.4 %      From  Jan 2022    comapred to  6.4 %      From    July 2021   Compared to   5.9 %   From jan 2021  Compared to    6.5 %  By POC   Compared to  6.3 %      From  May 2019    Compared to    6.3 %     From oct 2018    comapred to   6.4 %        From    April 2018         Stay on metformin   and   Trulicity       August 2023      Stay on metformin   and   Trulicity     Jan 2023     Stay on metformin   and   Trulicity   She opted to stay on low dose   Encouraging her to do diet more       2. Hypoglycemia : should not occur on current regimen    3. HTN : well controlled, on norvasc and coreg  BID   She turned out to be allergic to CARTIA   Acei/arb INCREASED HER CREAT ( she does have vascular disease )  CT abd showed Atherosclerotic disease         4. Dyslipidemia : lipids  Are off   on lipitor 40 mg hs , await  labs today       5. h/o right foot diabetic ulcer, fourth toe -- resolved ,  And that was when she found out to be diabetic - October 2012  Ordering RIVER     ( podiatrist : Dr. Yang )      7. 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

## 2024-02-24 RX ORDER — ATORVASTATIN CALCIUM 40 MG/1
40 TABLET, FILM COATED ORAL NIGHTLY
Qty: 90 TABLET | Refills: 3 | Status: SHIPPED | OUTPATIENT
Start: 2024-02-24

## 2024-02-28 ENCOUNTER — HOSPITAL ENCOUNTER (OUTPATIENT)
Facility: HOSPITAL | Age: 74
Discharge: HOME | End: 2024-03-01
Attending: INTERNAL MEDICINE
Payer: MEDICARE

## 2024-02-28 DIAGNOSIS — E11.65 TYPE 2 DIABETES MELLITUS WITH HYPERGLYCEMIA, WITHOUT LONG-TERM CURRENT USE OF INSULIN (HCC): ICD-10-CM

## 2024-02-28 DIAGNOSIS — I73.9 PAD (PERIPHERAL ARTERY DISEASE) (HCC): ICD-10-CM

## 2024-02-28 PROCEDURE — 93922 UPR/L XTREMITY ART 2 LEVELS: CPT

## 2024-02-29 PROBLEM — I73.9 PAD (PERIPHERAL ARTERY DISEASE) (HCC): Status: ACTIVE | Noted: 2024-02-29

## 2024-02-29 LAB
VAS LEFT ABI: 0.93
VAS LEFT ARM BP: 167 MMHG
VAS LEFT DORSALIS PEDIS BP: 157 MMHG
VAS LEFT PTA BP: 159 MMHG
VAS LEFT TBI: 0.7
VAS LEFT TOE PRESSURE: 120 MMHG
VAS RIGHT ABI: 1.01
VAS RIGHT ARM BP: 171 MMHG
VAS RIGHT DORSALIS PEDIS BP: 172 MMHG
VAS RIGHT PTA BP: 160 MMHG
VAS RIGHT TBI: 0.5
VAS RIGHT TOE PRESSURE: 85 MMHG

## 2024-04-06 DIAGNOSIS — E78.2 MIXED HYPERLIPIDEMIA: ICD-10-CM

## 2024-04-06 DIAGNOSIS — E11.65 TYPE 2 DIABETES MELLITUS WITH HYPERGLYCEMIA, WITHOUT LONG-TERM CURRENT USE OF INSULIN (HCC): Primary | ICD-10-CM

## 2024-04-06 DIAGNOSIS — I10 ESSENTIAL (PRIMARY) HYPERTENSION: ICD-10-CM

## 2024-04-06 RX ORDER — AMLODIPINE BESYLATE 10 MG/1
TABLET ORAL DAILY
Qty: 90 TABLET | Refills: 1 | Status: SHIPPED | OUTPATIENT
Start: 2024-04-06

## 2024-04-06 RX ORDER — CARVEDILOL 25 MG/1
TABLET ORAL
Qty: 180 TABLET | Refills: 1 | Status: SHIPPED | OUTPATIENT
Start: 2024-04-06

## 2024-08-30 ENCOUNTER — LAB (OUTPATIENT)
Age: 74
End: 2024-08-30

## 2024-08-30 DIAGNOSIS — I10 ESSENTIAL (PRIMARY) HYPERTENSION: ICD-10-CM

## 2024-08-30 DIAGNOSIS — E11.65 TYPE 2 DIABETES MELLITUS WITH HYPERGLYCEMIA, WITHOUT LONG-TERM CURRENT USE OF INSULIN (HCC): ICD-10-CM

## 2024-08-30 DIAGNOSIS — E78.2 MIXED HYPERLIPIDEMIA: ICD-10-CM

## 2024-08-30 LAB
ALBUMIN SERPL-MCNC: 3.6 G/DL (ref 3.5–5)
ALBUMIN/GLOB SERPL: 0.9 (ref 1.1–2.2)
ALP SERPL-CCNC: 158 U/L (ref 45–117)
ALT SERPL-CCNC: 22 U/L (ref 12–78)
ANION GAP SERPL CALC-SCNC: 7 MMOL/L (ref 5–15)
AST SERPL-CCNC: 17 U/L (ref 15–37)
BILIRUB SERPL-MCNC: 0.4 MG/DL (ref 0.2–1)
BUN SERPL-MCNC: 21 MG/DL (ref 6–20)
BUN/CREAT SERPL: 16 (ref 12–20)
CALCIUM SERPL-MCNC: 9.3 MG/DL (ref 8.5–10.1)
CHLORIDE SERPL-SCNC: 109 MMOL/L (ref 97–108)
CHOLEST SERPL-MCNC: 146 MG/DL
CO2 SERPL-SCNC: 24 MMOL/L (ref 21–32)
CREAT SERPL-MCNC: 1.32 MG/DL (ref 0.55–1.02)
CREAT UR-MCNC: 167 MG/DL
EST. AVERAGE GLUCOSE BLD GHB EST-MCNC: 146 MG/DL
GLOBULIN SER CALC-MCNC: 4.1 G/DL (ref 2–4)
GLUCOSE SERPL-MCNC: 99 MG/DL (ref 65–100)
HBA1C MFR BLD: 6.7 % (ref 4–5.6)
HDLC SERPL-MCNC: 41 MG/DL
HDLC SERPL: 3.6 (ref 0–5)
LDLC SERPL CALC-MCNC: 87.6 MG/DL (ref 0–100)
MICROALBUMIN UR-MCNC: 6.19 MG/DL
MICROALBUMIN/CREAT UR-RTO: 37 MG/G (ref 0–30)
POTASSIUM SERPL-SCNC: 4.2 MMOL/L (ref 3.5–5.1)
PROT SERPL-MCNC: 7.7 G/DL (ref 6.4–8.2)
SODIUM SERPL-SCNC: 140 MMOL/L (ref 136–145)
TRIGL SERPL-MCNC: 87 MG/DL
VLDLC SERPL CALC-MCNC: 17.4 MG/DL

## 2024-09-06 ENCOUNTER — OFFICE VISIT (OUTPATIENT)
Age: 74
End: 2024-09-06

## 2024-09-06 VITALS
DIASTOLIC BLOOD PRESSURE: 62 MMHG | TEMPERATURE: 98.1 F | HEART RATE: 77 BPM | WEIGHT: 215.2 LBS | SYSTOLIC BLOOD PRESSURE: 133 MMHG | BODY MASS INDEX: 40.63 KG/M2 | HEIGHT: 61 IN | OXYGEN SATURATION: 94 %

## 2024-09-06 DIAGNOSIS — I10 ESSENTIAL (PRIMARY) HYPERTENSION: ICD-10-CM

## 2024-09-06 DIAGNOSIS — E78.2 MIXED HYPERLIPIDEMIA: ICD-10-CM

## 2024-09-06 DIAGNOSIS — E11.65 TYPE 2 DIABETES MELLITUS WITH HYPERGLYCEMIA, WITHOUT LONG-TERM CURRENT USE OF INSULIN (HCC): Primary | ICD-10-CM

## 2024-09-06 PROCEDURE — 3044F HG A1C LEVEL LT 7.0%: CPT | Performed by: INTERNAL MEDICINE

## 2024-09-06 PROCEDURE — 1123F ACP DISCUSS/DSCN MKR DOCD: CPT | Performed by: INTERNAL MEDICINE

## 2024-09-06 PROCEDURE — 3078F DIAST BP <80 MM HG: CPT | Performed by: INTERNAL MEDICINE

## 2024-09-06 PROCEDURE — 99214 OFFICE O/P EST MOD 30 MIN: CPT | Performed by: INTERNAL MEDICINE

## 2024-09-06 PROCEDURE — 3075F SYST BP GE 130 - 139MM HG: CPT | Performed by: INTERNAL MEDICINE

## 2024-09-06 RX ORDER — AMLODIPINE BESYLATE 10 MG/1
TABLET ORAL
Qty: 90 TABLET | Refills: 3 | Status: SHIPPED | OUTPATIENT
Start: 2024-09-06

## 2024-09-06 NOTE — PROGRESS NOTES
Naomi Sagastume is a 74 y.o. female here for   Chief Complaint   Patient presents with    Diabetes    Hypertension    Hyperlipidemia       1. Have you been to the ER, urgent care clinic since your last visit?  Hospitalized since your last visit? -No    2. Have you seen or consulted any other health care providers outside of the [facility] since your last visit?  Include any pap smears or colon screening.-No    /62 (Site: Left Upper Arm, Position: Sitting, Cuff Size: Large Adult)   Pulse 77   Temp 98.1 °F (36.7 °C) (Temporal)   Ht 1.549 m (5' 1\")   Wt 97.6 kg (215 lb 3.2 oz)   SpO2 94%   BMI 40.66 kg/m²

## 2024-09-06 NOTE — PATIENT INSTRUCTIONS
SPECIFIC INSTRUCTIONS BELOW        DRINK water   Do not skip meals  Do not eat in between meals     Reduce carbs- pasta, rice, potatoes, bread   Try to avoid processed bread products like BISCUITS, CROISSANTS, MUFFINS     Do not drink juices or sodas, even if they are calorie zero or diet drinks and especially avoid using powders like crystalloids , JAELYN-AIDS      Do not eat peanut butter      Do not eat sugar free cookies and cakes   Do not eat peaches, oranges, pineapples, raisins, grapes , canteloupe , honey dew, mangoes , watermelon  and fruit medleys        ----------------------------------------------------------------------------------------------------------        lipitor  40 mg at night         Increase   trulicity 1. 5 mg a week      Metformin 500 mg    Plain,   twice a day with meals      norvasc 10 mg a day       coreg 25 mg  One pill  twice a day               -------------PAY ATTENTION TO THESE GENERAL INSTRUCTIONS -----------------      - The medications prescribed at this visit will not be available at pharmacy until 6 pm       - YOUR MED LIST IS NOT UP TO DATE AS SOME CHANGES ARE BEING MADE AFTER THE VISIT - FOLLOW SPECIFIC INSTRUCTIONS  ABOVE     -ANY tests other than blood work, which you opt to do  outside the  [facility] imaging facilities, you are responsible for prior authorizations if  required    - HEALTH MAINTENANCE IS NOT GOING TO BE UP TO DATE ON YOUR AVS- PLEASE IGNORE     Results     *Normal results will not be notified by a phone call starting January 1 2021   *If you have an upcoming visit, the results will be discussed at the visit   *Please sign up for MY CHART if you want access to your lab and test results  *Abnormal results which require immediate attention will be notified by phone call   *Abnormal results which do not require immediate assistance will be notified in 1-2 weeks       Refills    -    have your pharmacy send us a refill request . Refills are done max for one

## 2024-09-06 NOTE — PROGRESS NOTES
[facility] DIABETES AND ENDOCRINOLOGY              Lorie Cui MD FACE         HISTORY OF PRESENT ILLNESS    Naomi Sagastume is a 74    y.o. female.  HPI,   Patient here for f/u after last  visit for  Type 2 diabetes mellitus  From  feb 2024        LOST 10 lbs   Tolerating trulicity  1.5 mg  a week       Feb 2024       Gained  4 lbs         Old history     She got hospitalized with severe allergic reaction,  And got released  y- day   From the hospital  She had to change to onglyza and cartia from tradjenta and coreg cr  3 weeks ago   She developed skin rash   A week ago          Old history : .   Current A1C is 6.7 % today, compared to [facility]- over 10 %   Current diabetic medications include janumet and amaryl 2mg a day.   accompanied by her sister, who is a cook   Current monitoring regimen: home blood tests - 2-3 times daily     Review of Systems   Constitutional: Negative.    Psychiatric/Behavioral: Negative for depression and memory loss. The patient does not have insomnia.          Physical Exam   Constitutional: oriented to person, place, and time.  appears well-developed and well-nourished.   Psychiatric: He has a normal mood and affect.       Diabetic feet exam :  august 2023     H/o partial or complete amputation of foot : N  H/o previous foot ulceration ; YES  H/o pre - ulcerative callus : Y  H/o peripheral neuropathy and callus : Y  H/o poor circulation     RIVER   : N  Foot deformity : dystrophic nails       Labs      Lab Results   Component Value Date    LABA1C 6.7 (H) 08/30/2024    LABA1C 6.9 (H) 02/07/2024    LABA1C 6.3 (H) 08/07/2023       Lab Results   Component Value Date     08/30/2024    K 4.2 08/30/2024     (H) 08/30/2024    CO2 24 08/30/2024    BUN 21 (H) 08/30/2024    CREATININE 1.32 (H) 08/30/2024    GLUCOSE 99 08/30/2024    CALCIUM 9.3 08/30/2024    BILITOT 0.4 08/30/2024    ALKPHOS 158 (H) 08/30/2024    AST 17 08/30/2024    ALT 22 08/30/2024    LABGLOM 43

## 2024-10-09 DIAGNOSIS — E11.65 TYPE 2 DIABETES MELLITUS WITH HYPERGLYCEMIA, WITHOUT LONG-TERM CURRENT USE OF INSULIN (HCC): ICD-10-CM

## 2024-10-09 DIAGNOSIS — I10 ESSENTIAL (PRIMARY) HYPERTENSION: ICD-10-CM

## 2024-10-09 DIAGNOSIS — E78.2 MIXED HYPERLIPIDEMIA: ICD-10-CM

## 2024-10-09 RX ORDER — CARVEDILOL 25 MG/1
TABLET ORAL
Qty: 180 TABLET | Refills: 3 | Status: SHIPPED | OUTPATIENT
Start: 2024-10-09

## 2025-02-28 ENCOUNTER — LAB (OUTPATIENT)
Age: 75
End: 2025-02-28

## 2025-02-28 DIAGNOSIS — E78.2 MIXED HYPERLIPIDEMIA: ICD-10-CM

## 2025-02-28 DIAGNOSIS — E11.65 TYPE 2 DIABETES MELLITUS WITH HYPERGLYCEMIA, WITHOUT LONG-TERM CURRENT USE OF INSULIN (HCC): ICD-10-CM

## 2025-02-28 DIAGNOSIS — I10 ESSENTIAL (PRIMARY) HYPERTENSION: ICD-10-CM

## 2025-02-28 LAB
ALBUMIN SERPL-MCNC: 3.6 G/DL (ref 3.5–5)
ALBUMIN/GLOB SERPL: 0.9 (ref 1.1–2.2)
ALP SERPL-CCNC: 163 U/L (ref 45–117)
ALT SERPL-CCNC: 19 U/L (ref 12–78)
ANION GAP SERPL CALC-SCNC: 7 MMOL/L (ref 2–12)
AST SERPL-CCNC: 15 U/L (ref 15–37)
BILIRUB SERPL-MCNC: 0.3 MG/DL (ref 0.2–1)
BUN SERPL-MCNC: 19 MG/DL (ref 6–20)
BUN/CREAT SERPL: 17 (ref 12–20)
CALCIUM SERPL-MCNC: 9.5 MG/DL (ref 8.5–10.1)
CHLORIDE SERPL-SCNC: 108 MMOL/L (ref 97–108)
CHOLEST SERPL-MCNC: 142 MG/DL
CO2 SERPL-SCNC: 23 MMOL/L (ref 21–32)
CREAT SERPL-MCNC: 1.14 MG/DL (ref 0.55–1.02)
CREAT UR-MCNC: 109 MG/DL
EST. AVERAGE GLUCOSE BLD GHB EST-MCNC: 140 MG/DL
GLOBULIN SER CALC-MCNC: 4.1 G/DL (ref 2–4)
GLUCOSE SERPL-MCNC: 126 MG/DL (ref 65–100)
HBA1C MFR BLD: 6.5 % (ref 4–5.6)
HDLC SERPL-MCNC: 44 MG/DL
HDLC SERPL: 3.2 (ref 0–5)
LDLC SERPL CALC-MCNC: 81.6 MG/DL (ref 0–100)
MICROALBUMIN UR-MCNC: 3.26 MG/DL
MICROALBUMIN/CREAT UR-RTO: 30 MG/G (ref 0–30)
POTASSIUM SERPL-SCNC: 4.2 MMOL/L (ref 3.5–5.1)
PROT SERPL-MCNC: 7.7 G/DL (ref 6.4–8.2)
SODIUM SERPL-SCNC: 138 MMOL/L (ref 136–145)
TRIGL SERPL-MCNC: 82 MG/DL
VLDLC SERPL CALC-MCNC: 16.4 MG/DL

## 2025-03-07 ENCOUNTER — OFFICE VISIT (OUTPATIENT)
Age: 75
End: 2025-03-07
Payer: MEDICARE

## 2025-03-07 VITALS
TEMPERATURE: 97.6 F | DIASTOLIC BLOOD PRESSURE: 60 MMHG | WEIGHT: 222.2 LBS | OXYGEN SATURATION: 97 % | HEIGHT: 61 IN | HEART RATE: 75 BPM | SYSTOLIC BLOOD PRESSURE: 134 MMHG | BODY MASS INDEX: 41.95 KG/M2

## 2025-03-07 DIAGNOSIS — E11.65 TYPE 2 DIABETES MELLITUS WITH HYPERGLYCEMIA, WITHOUT LONG-TERM CURRENT USE OF INSULIN (HCC): Primary | ICD-10-CM

## 2025-03-07 DIAGNOSIS — I10 ESSENTIAL (PRIMARY) HYPERTENSION: ICD-10-CM

## 2025-03-07 DIAGNOSIS — E78.2 MIXED HYPERLIPIDEMIA: ICD-10-CM

## 2025-03-07 PROCEDURE — 1126F AMNT PAIN NOTED NONE PRSNT: CPT | Performed by: INTERNAL MEDICINE

## 2025-03-07 PROCEDURE — 1123F ACP DISCUSS/DSCN MKR DOCD: CPT | Performed by: INTERNAL MEDICINE

## 2025-03-07 PROCEDURE — G8428 CUR MEDS NOT DOCUMENT: HCPCS | Performed by: INTERNAL MEDICINE

## 2025-03-07 PROCEDURE — 2022F DILAT RTA XM EVC RTNOPTHY: CPT | Performed by: INTERNAL MEDICINE

## 2025-03-07 PROCEDURE — G8400 PT W/DXA NO RESULTS DOC: HCPCS | Performed by: INTERNAL MEDICINE

## 2025-03-07 PROCEDURE — 3017F COLORECTAL CA SCREEN DOC REV: CPT | Performed by: INTERNAL MEDICINE

## 2025-03-07 PROCEDURE — G8417 CALC BMI ABV UP PARAM F/U: HCPCS | Performed by: INTERNAL MEDICINE

## 2025-03-07 PROCEDURE — 99214 OFFICE O/P EST MOD 30 MIN: CPT | Performed by: INTERNAL MEDICINE

## 2025-03-07 PROCEDURE — 3078F DIAST BP <80 MM HG: CPT | Performed by: INTERNAL MEDICINE

## 2025-03-07 PROCEDURE — 3044F HG A1C LEVEL LT 7.0%: CPT | Performed by: INTERNAL MEDICINE

## 2025-03-07 PROCEDURE — 1090F PRES/ABSN URINE INCON ASSESS: CPT | Performed by: INTERNAL MEDICINE

## 2025-03-07 PROCEDURE — 1036F TOBACCO NON-USER: CPT | Performed by: INTERNAL MEDICINE

## 2025-03-07 PROCEDURE — 3075F SYST BP GE 130 - 139MM HG: CPT | Performed by: INTERNAL MEDICINE

## 2025-03-07 NOTE — PATIENT INSTRUCTIONS
SPECIFIC INSTRUCTIONS BELOW        DRINK water   Do not skip meals  Do not eat in between meals     Reduce carbs- pasta, rice, potatoes, bread   Try to avoid processed bread products like BISCUITS, CROISSANTS, MUFFINS     Do not drink juices or sodas, even if they are calorie zero or diet drinks and especially avoid using powders like crystalloids , JAELYN-AIDS      Do not eat peanut butter      Do not eat sugar free cookies and cakes   Do not eat peaches, oranges, pineapples, raisins, grapes , canteloupe , honey dew, mangoes , watermelon  and fruit medleys        ----------------------------------------------------------------------------------------------------------        lipitor  40 mg at night       trulicity 1. 5 mg a week      Metformin 500 mg    Plain,   twice a day with meals      norvasc 10 mg a day       coreg 25 mg  One pill  twice a day               -------------PAY ATTENTION TO THESE GENERAL INSTRUCTIONS -----------------      - The medications prescribed at this visit will not be available at pharmacy until 6 pm       - YOUR MED LIST IS NOT UP TO DATE AS SOME CHANGES ARE BEING MADE AFTER THE VISIT - FOLLOW SPECIFIC INSTRUCTIONS  ABOVE     -ANY tests other than blood work, which you opt to do  outside the  [facility] imaging facilities, you are responsible for prior authorizations if  required    - HEALTH MAINTENANCE IS NOT GOING TO BE UP TO DATE ON YOUR AVS- PLEASE IGNORE     Results     *Normal results will not be notified by a phone call starting January 1 2021   *If you have an upcoming visit, the results will be discussed at the visit   *Please sign up for MY CHART if you want access to your lab and test results  *Abnormal results which require immediate attention will be notified by phone call   *Abnormal results which do not require immediate assistance will be notified in 1-2 weeks       Refills    -    have your pharmacy send us a refill request . Refills are done max for one year and a

## 2025-03-07 NOTE — PROGRESS NOTES
Naomi Sagastume is a 74 y.o. female here for   Chief Complaint   Patient presents with    Diabetes       1. Have you been to the ER, urgent care clinic since your last visit?  Hospitalized since your last visit? -No    2. Have you seen or consulted any other health care providers outside of the [facility] since your last visit?  Include any pap smears or colon screening.-No      
          ASSESSMENT and PLAN      1.  Type 2 DM uncontrolled : A1c is   6.5 %    from   feb 2025   compared  to   6.7 %     from  August 2024       compared   to    6.9 %    from   feb 2024   compared to   6.3 %   from  august 2023        Compared to   6.6 %    From  July 2022   Compared to   6.4 %      From  Jan 2022    comapred to  6.4 %      From    July 2021   Compared to   5.9 %   From jan 2021      Stay on metformin   and   Trulicity @ 1.5 mg a week   She does not want to go higher on dose of trulicity       2. Hypoglycemia : should not occur on current regimen        3. HTN : well controlled, on norvasc and coreg  BID   She turned out to be allergic to CARTIA   Acei/arb INCREASED HER CREAT ( she does have vascular disease )  CT abd showed Atherosclerotic disease         4. Dyslipidemia : lipids  Are  good    on lipitor 40 mg hs        5. h/o right foot diabetic ulcer, ( podiatrist : Dr. Yang )      6 . 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 OBESITY  :    Feb 2025    : Body mass index is 41.98 kg/m².             Labs bnv in 6 months   Patient voiced understanding her plan of care

## 2025-04-05 DIAGNOSIS — I10 ESSENTIAL (PRIMARY) HYPERTENSION: ICD-10-CM

## 2025-04-05 DIAGNOSIS — E11.65 TYPE 2 DIABETES MELLITUS WITH HYPERGLYCEMIA, WITHOUT LONG-TERM CURRENT USE OF INSULIN (HCC): ICD-10-CM

## 2025-04-05 DIAGNOSIS — E78.2 MIXED HYPERLIPIDEMIA: ICD-10-CM

## 2025-04-30 DIAGNOSIS — I73.9 PAD (PERIPHERAL ARTERY DISEASE): ICD-10-CM

## 2025-04-30 DIAGNOSIS — I10 ESSENTIAL (PRIMARY) HYPERTENSION: ICD-10-CM

## 2025-04-30 DIAGNOSIS — E78.2 MIXED HYPERLIPIDEMIA: Primary | ICD-10-CM

## 2025-05-01 RX ORDER — ATORVASTATIN CALCIUM 40 MG/1
40 TABLET, FILM COATED ORAL NIGHTLY
Qty: 90 TABLET | Refills: 3 | Status: SHIPPED | OUTPATIENT
Start: 2025-05-01